# Patient Record
Sex: FEMALE | Race: WHITE | NOT HISPANIC OR LATINO | Employment: UNEMPLOYED | URBAN - METROPOLITAN AREA
[De-identification: names, ages, dates, MRNs, and addresses within clinical notes are randomized per-mention and may not be internally consistent; named-entity substitution may affect disease eponyms.]

---

## 2018-01-10 NOTE — MISCELLANEOUS
Message   Recorded as Task   Date: 02/01/2016 05:06 PM, Created By: Devaughn Alvarez   Task Name: Follow Up   Assigned To: [address] [facility], [address] [facility] Procedure,Team   Regarding Patient: Lore Joyner, Status: Active   CommentSheri Finder - 01 Feb 2016 5:06 PM     TASK CREATED  LMOM for pt to return call  Pt is post R HIP INTRA ARTICULAR INJECTION done on 1/26/16 with Saira Velasquez - 02 Feb 2016 10:10 AM     TASK EDITED   Devaughn Alvarez - 02 Feb 2016 11:46 AM     TASK EDITED  Pt lmom returning office call  Spoke to pt, she reports 40% improvement in pain, she has started physical therapy  Informed pt that it may take up to two weeks for further improvemnet in her pain  Pt verbalized understanding & appreciative  [address] - 02 Feb 2016 11:47 AM     TASK REPLIED TO: Previously Assigned To [place]  Thanks  Let patient know that we can repeat injection if she doesn't receive good pain relief after two weeks  Active Problems    1  Arthralgia of left hip (719 45) (M25 552)   2  Arthralgia of right hip (719 45) (M25 551)   3  Back pain (724 5) (M54 9)   4  Carpal tunnel syndrome, unspecified laterality (354 0) (G56 00)   5  Chronic pain syndrome (338 4) (G89 4)   6  Hand pain (729 5) (M79 643)   7  Lumbago (724 2) (M54 5)   8  Lumbar neuritis (724 4) (M54 16)   9  Pain in wrist, unspecified laterality (719 43) (M25 539)   10  Primary osteoarthritis of left wrist (715 13) (M19 032)   11  Right hip pain (719 45) (M25 551)   12  Sprain of right hip (843 9) (S73 101A)   13  Thyroid disorder (246 9) (E07 9)    Current Meds   1  Fluocinolone Acetonide 0 025 % External Ointment; Therapy: [id number] to (Evaluate:03Mar2015) Recorded   2  Synthroid TABS (Levothyroxine Sodium); Therapy: (Recorded:20Jan2014) to Recorded    Allergies    1   No Known Drug Allergies    Signatures   Electronically signed by : Leighann Andrade RN; Feb 2 2016  2:23PM EST (Author)

## 2018-01-15 NOTE — MISCELLANEOUS
Message   Recorded as Task   Date: 03/18/2016 09:48 AM, Created By: Marika Guillermo   Task Name: Call Back   Assigned To: [address] Procedure,Team   Regarding Patient: Jamil Irizarry, Status: In Progress   Comment:    Leyda Sauer - 18 Mar 2016 9:48 AM     TASK CREATED  Patient states she she has received 60% improvement from her procedure  S/P R HIP INTRA ATRICULAR INJECTION on 3/10/16 by Dr Clifton Hawkins [facility] - 18 Mar 2016 9:53 AM     TASK REPLIED TO: Previously Assigned To [address] Procedure,Team  Aware  Thanks  Leyda Sauer - 18 Mar 2016 9:54 AM     TASK IN PROGRESS        Active Problems    1  Arthralgia of left hip (719 45) (M25 552)   2  Arthralgia of right hip (719 45) (M25 551)   3  Back pain (724 5) (M54 9)   4  Carpal tunnel syndrome, unspecified laterality (354 0) (G56 00)   5  Chronic pain syndrome (338 4) (G89 4)   6  Hand pain (729 5) (M79 643)   7  Lumbago (724 2) (M54 5)   8  Lumbar neuritis (724 4) (M54 16)   9  Pain in wrist, unspecified laterality (719 43) (M25 539)   10  Primary osteoarthritis of left wrist (715 13) (M19 032)   11  Right hip pain (719 45) (M25 551)   12  Sprain of right hip (843 9) (S73 101A)   13  Thyroid disorder (246 9) (E07 9)    Current Meds   1  Fluocinolone Acetonide 0 025 % External Ointment; Therapy: [id number] to (Evaluate:03Mar2015) Recorded   2  Synthroid TABS (Levothyroxine Sodium); Therapy: (Recorded:20Jan2014) to Recorded    Allergies    1   No Known Drug Allergies    Signatures   Electronically signed by : Sammy Damon RN; Mar 18 2016  2:18PM EST                       (Author)

## 2020-06-29 ENCOUNTER — TRANSCRIBE ORDERS (OUTPATIENT)
Dept: ADMINISTRATIVE | Facility: HOSPITAL | Age: 65
End: 2020-06-29

## 2020-06-29 DIAGNOSIS — Z09 FOLLOW UP: Primary | ICD-10-CM

## 2020-06-29 DIAGNOSIS — Z12.31 SCREENING MAMMOGRAM FOR HIGH-RISK PATIENT: ICD-10-CM

## 2020-08-28 ENCOUNTER — HOSPITAL ENCOUNTER (OUTPATIENT)
Dept: RADIOLOGY | Facility: HOSPITAL | Age: 65
Discharge: HOME | End: 2020-08-28
Payer: COMMERCIAL

## 2020-08-28 VITALS — BODY MASS INDEX: 25.83 KG/M2 | WEIGHT: 155 LBS | HEIGHT: 65 IN

## 2020-08-28 DIAGNOSIS — Z09 FOLLOW UP: ICD-10-CM

## 2020-08-28 DIAGNOSIS — Z12.31 SCREENING MAMMOGRAM FOR HIGH-RISK PATIENT: ICD-10-CM

## 2020-08-28 DIAGNOSIS — Z09 FOLLOW-UP EXAM, 3-6 MONTHS SINCE PREVIOUS EXAM: ICD-10-CM

## 2020-08-28 PROCEDURE — G0279 TOMOSYNTHESIS, MAMMO: HCPCS

## 2020-08-28 PROCEDURE — 77066 DX MAMMO INCL CAD BI: CPT

## 2020-08-28 PROCEDURE — 76642 ULTRASOUND BREAST LIMITED: CPT

## 2021-04-13 DIAGNOSIS — Z23 ENCOUNTER FOR IMMUNIZATION: ICD-10-CM

## 2021-09-03 ENCOUNTER — HOSPITAL ENCOUNTER (OUTPATIENT)
Dept: RADIOLOGY | Facility: HOSPITAL | Age: 66
Discharge: HOME | End: 2021-09-03

## 2021-09-15 ENCOUNTER — HOSPITAL ENCOUNTER (OUTPATIENT)
Dept: RADIOLOGY | Facility: HOSPITAL | Age: 66
Discharge: HOME | End: 2021-09-15
Payer: COMMERCIAL

## 2021-09-15 VITALS — HEIGHT: 65 IN | BODY MASS INDEX: 25.83 KG/M2 | WEIGHT: 155 LBS

## 2021-09-15 DIAGNOSIS — Z12.31 SCREENING MAMMOGRAM, ENCOUNTER FOR: ICD-10-CM

## 2021-09-15 PROCEDURE — G0279 TOMOSYNTHESIS, MAMMO: HCPCS

## 2021-09-15 PROCEDURE — 77066 DX MAMMO INCL CAD BI: CPT

## 2021-09-15 PROCEDURE — 76642 ULTRASOUND BREAST LIMITED: CPT

## 2021-11-29 ENCOUNTER — ANESTHESIA EVENT (OUTPATIENT)
Dept: PERIOP | Facility: AMBULARY SURGERY CENTER | Age: 66
End: 2021-11-29
Payer: COMMERCIAL

## 2021-11-29 ENCOUNTER — HOSPITAL ENCOUNTER (OUTPATIENT)
Facility: AMBULARY SURGERY CENTER | Age: 66
Setting detail: OUTPATIENT SURGERY
Discharge: HOME | End: 2021-11-29
Attending: OPHTHALMOLOGY | Admitting: OPHTHALMOLOGY
Payer: COMMERCIAL

## 2021-11-29 ENCOUNTER — ANESTHESIA (OUTPATIENT)
Dept: PERIOP | Facility: AMBULARY SURGERY CENTER | Age: 66
End: 2021-11-29
Payer: COMMERCIAL

## 2021-11-29 VITALS
DIASTOLIC BLOOD PRESSURE: 83 MMHG | RESPIRATION RATE: 18 BRPM | BODY MASS INDEX: 25.83 KG/M2 | HEIGHT: 65 IN | SYSTOLIC BLOOD PRESSURE: 136 MMHG | OXYGEN SATURATION: 98 % | HEART RATE: 80 BPM | TEMPERATURE: 96.7 F | WEIGHT: 155 LBS

## 2021-11-29 DIAGNOSIS — H25.11 AGE-RELATED NUCLEAR CATARACT OF RIGHT EYE: Primary | ICD-10-CM

## 2021-11-29 PROBLEM — E03.9 HYPOTHYROIDISM: Status: ACTIVE | Noted: 2021-11-29

## 2021-11-29 PROCEDURE — V2632 POST CHMBR INTRAOCULAR LENS: HCPCS | Performed by: OPHTHALMOLOGY

## 2021-11-29 DEVICE — ACRYSOF(R) IQ ASPHERIC NATURAL IOL, SINGLE-PIECE ACRYLIC FOLDABLE PCL, UV WITH BLUE LIGHTFILTER, 13.0MM LENGTH, 6.0MM ANTERIORASYMMETRIC BICONVEX OPTIC, PLANAR HAPTICS.
Type: IMPLANTABLE DEVICE | Site: EYE | Status: FUNCTIONAL
Brand: ACRYSOF®

## 2021-11-29 RX ORDER — PHENYLEPHRINE HCL 2.5 %
1 DROPS OPHTHALMIC (EYE)
Status: COMPLETED | OUTPATIENT
Start: 2021-11-29 | End: 2021-11-29

## 2021-11-29 RX ORDER — CYCLOPENTOLATE HYDROCHLORIDE 10 MG/ML
1 SOLUTION/ DROPS OPHTHALMIC
Status: COMPLETED | OUTPATIENT
Start: 2021-11-29 | End: 2021-11-29

## 2021-11-29 RX ORDER — GATIFLOXACIN 5 MG/ML
SOLUTION/ DROPS OPHTHALMIC AS NEEDED
Status: DISCONTINUED | OUTPATIENT
Start: 2021-11-29 | End: 2021-11-29 | Stop reason: HOSPADM

## 2021-11-29 RX ORDER — MIDAZOLAM HYDROCHLORIDE 2 MG/2ML
INJECTION, SOLUTION INTRAMUSCULAR; INTRAVENOUS AS NEEDED
Status: DISCONTINUED | OUTPATIENT
Start: 2021-11-29 | End: 2021-11-29

## 2021-11-29 RX ORDER — TETRACAINE HYDROCHLORIDE 5 MG/ML
1 SOLUTION OPHTHALMIC ONCE
Status: COMPLETED | OUTPATIENT
Start: 2021-11-29 | End: 2021-11-29

## 2021-11-29 RX ORDER — LIDOCAINE HYDROCHLORIDE 20 MG/ML
1 JELLY TOPICAL
Status: COMPLETED | OUTPATIENT
Start: 2021-11-29 | End: 2021-11-29

## 2021-11-29 RX ORDER — GATIFLOXACIN 5 MG/ML
1 SOLUTION/ DROPS OPHTHALMIC 2 TIMES DAILY
Qty: 3 ML | Refills: 0 | Status: ON HOLD
Start: 2021-11-29 | End: 2022-01-03 | Stop reason: SDUPTHER

## 2021-11-29 RX ORDER — KETOROLAC TROMETHAMINE 5 MG/ML
1 SOLUTION OPHTHALMIC 4 TIMES DAILY
Qty: 5 ML | Refills: 0 | Status: ON HOLD
Start: 2021-11-29 | End: 2022-01-03 | Stop reason: SDUPTHER

## 2021-11-29 RX ORDER — BALANCED SALT SOLUTION 6.4; .75; .48; .3; 3.9; 1.7 MG/ML; MG/ML; MG/ML; MG/ML; MG/ML; MG/ML
SOLUTION OPHTHALMIC AS NEEDED
Status: DISCONTINUED | OUTPATIENT
Start: 2021-11-29 | End: 2021-11-29 | Stop reason: HOSPADM

## 2021-11-29 RX ORDER — LIDOCAINE HYDROCHLORIDE 10 MG/ML
INJECTION, SOLUTION EPIDURAL; INFILTRATION; INTRACAUDAL; PERINEURAL AS NEEDED
Status: DISCONTINUED | OUTPATIENT
Start: 2021-11-29 | End: 2021-11-29 | Stop reason: HOSPADM

## 2021-11-29 RX ORDER — KETOROLAC TROMETHAMINE 5 MG/ML
1 SOLUTION OPHTHALMIC
Status: COMPLETED | OUTPATIENT
Start: 2021-11-29 | End: 2021-11-29

## 2021-11-29 RX ORDER — TETRACAINE HYDROCHLORIDE 5 MG/ML
SOLUTION OPHTHALMIC AS NEEDED
Status: DISCONTINUED | OUTPATIENT
Start: 2021-11-29 | End: 2021-11-29 | Stop reason: HOSPADM

## 2021-11-29 RX ADMIN — PHENYLEPHRINE HYDROCHLORIDE 1 DROP: 25 SOLUTION/ DROPS OPHTHALMIC at 08:30

## 2021-11-29 RX ADMIN — KETOROLAC TROMETHAMINE 1 DROP: 5 SOLUTION OPHTHALMIC at 08:45

## 2021-11-29 RX ADMIN — TETRACAINE HYDROCHLORIDE 1 DROP: 5 SOLUTION OPHTHALMIC at 08:30

## 2021-11-29 RX ADMIN — CYCLOPENTOLATE HYDROCHLORIDE 1 DROP: 10 SOLUTION/ DROPS OPHTHALMIC at 08:45

## 2021-11-29 RX ADMIN — CYCLOPENTOLATE HYDROCHLORIDE 1 DROP: 10 SOLUTION/ DROPS OPHTHALMIC at 08:30

## 2021-11-29 RX ADMIN — KETOROLAC TROMETHAMINE 1 DROP: 5 SOLUTION OPHTHALMIC at 08:31

## 2021-11-29 RX ADMIN — KETOROLAC TROMETHAMINE 1 DROP: 5 SOLUTION OPHTHALMIC at 09:12

## 2021-11-29 RX ADMIN — CYCLOPENTOLATE HYDROCHLORIDE 1 DROP: 10 SOLUTION/ DROPS OPHTHALMIC at 08:59

## 2021-11-29 RX ADMIN — CYCLOPENTOLATE HYDROCHLORIDE 1 DROP: 10 SOLUTION/ DROPS OPHTHALMIC at 09:12

## 2021-11-29 RX ADMIN — KETOROLAC TROMETHAMINE 1 DROP: 5 SOLUTION OPHTHALMIC at 08:59

## 2021-11-29 RX ADMIN — LIDOCAINE HYDROCHLORIDE 1 APPLICATION: 20 JELLY TOPICAL at 08:31

## 2021-11-29 RX ADMIN — LIDOCAINE HYDROCHLORIDE 1 APPLICATION: 20 JELLY TOPICAL at 08:59

## 2021-11-29 RX ADMIN — PHENYLEPHRINE HYDROCHLORIDE 1 DROP: 25 SOLUTION/ DROPS OPHTHALMIC at 08:59

## 2021-11-29 RX ADMIN — PHENYLEPHRINE HYDROCHLORIDE 1 DROP: 25 SOLUTION/ DROPS OPHTHALMIC at 08:44

## 2021-11-29 RX ADMIN — LIDOCAINE HYDROCHLORIDE 1 APPLICATION: 20 JELLY TOPICAL at 08:45

## 2021-11-29 RX ADMIN — MIDAZOLAM 2 MG: 1 INJECTION INTRAMUSCULAR; INTRAVENOUS at 09:12

## 2021-11-29 RX ADMIN — PHENYLEPHRINE HYDROCHLORIDE 1 DROP: 25 SOLUTION/ DROPS OPHTHALMIC at 09:11

## 2021-12-28 NOTE — PRE-PROCEDURE INSTRUCTIONS
Pre-Surgery Instructions:   Medication Instructions    levothyroxine 100 mcg tablet Instructed patient per Anesthesia Guidelines  Pre op instructions reviewed with pt via phone  Instructed to take levothyroxine a m of surgery    Robel Real [phone]  My Surgical Experience    The following information was developed to assist you to prepare for your operation  What do I need to do before coming to the hospital?   Arrange for a responsible person to drive you to and from the hospital    Arrange care for your children at home  Children are not allowed in the recovery areas of the hospital   Plan to wear clothing that is easy to put on and take off  If you are having shoulder surgery, wear a shirt that buttons or zippers in the front  Bathing  o Shower the evening before and the morning of your surgery with an antibacterial soap  Please refer to the Pre Op Showering Instructions for Surgery Patients Sheet   o Remove nail polish and all body piercing jewelry  o Do not shave any body part for at least 24 hours before surgery-this includes face, arms, legs and upper body  Food  o Nothing to eat or drink after midnight the night before your surgery  This includes candy and chewing gum  o Exception: If your surgery is after 12:00pm (noon), you may have clear liquids such as 7-Up®, ginger ale, apple or cranberry juice, Jell-O®, water, or clear broth until 8:00 am  o Do not drink milk or juice with pulp on the morning before surgery  o Do not drink alcohol 24 hours before surgery  Medicine  o Follow instructions you received from your surgeon about which medicines you may take on the day of surgery  o If instructed to take medicine on the morning of surgery, take pills with just a small sip of water   Call your prescribing doctor for specific information on what to do if you take insulin    What should I bring to the hospital?    Bring:  Gabriella Gellanie or a walker, if you have them, for foot or knee surgery   A list of the daily medicines, vitamins, minerals, herbals and nutritional supplements you take  Include the dosages of medicines and the time you take them each day   Glasses, dentures or hearing aids   Minimal clothing; you will be wearing hospital sleepwear   Photo ID; required to verify your identity   If you have a Living Will or Power of , bring a copy of the documents   If you have an ostomy, bring an extra pouch and any supplies you use    Do not bring   Medicines or inhalers   Money, valuables or jewelry    What other information should I know about the day of surgery?  Notify your surgeons if you develop a cold, sore throat, cough, fever, rash or any other illness   Report to the Ambulatory Surgical/Same Day Surgery Unit   You will be instructed to stop at Registration only if you have not been pre-registered   Inform your  fi they do not stay that they will be asked by the staff to leave a phone number where they can be reached   Be available to be reached before surgery  In the event the operating room schedule changes, you may be asked to come in earlier or later than expected    *It is important to tell your doctor and others involved in your health care if you are taking or have been taking any non-prescription drugs, vitamins, minerals, herbals or other nutritional supplements   Any of these may interact with some food or medicines and cause a reaction

## 2022-01-03 ENCOUNTER — HOSPITAL ENCOUNTER (OUTPATIENT)
Facility: AMBULARY SURGERY CENTER | Age: 67
Setting detail: OUTPATIENT SURGERY
Discharge: HOME | End: 2022-01-03
Attending: OPHTHALMOLOGY | Admitting: OPHTHALMOLOGY
Payer: MEDICARE

## 2022-01-03 ENCOUNTER — ANESTHESIA (OUTPATIENT)
Dept: PERIOP | Facility: AMBULARY SURGERY CENTER | Age: 67
End: 2022-01-03
Payer: MEDICARE

## 2022-01-03 ENCOUNTER — ANESTHESIA EVENT (OUTPATIENT)
Dept: PERIOP | Facility: AMBULARY SURGERY CENTER | Age: 67
End: 2022-01-03
Payer: MEDICARE

## 2022-01-03 VITALS
TEMPERATURE: 97.8 F | OXYGEN SATURATION: 96 % | HEART RATE: 76 BPM | DIASTOLIC BLOOD PRESSURE: 70 MMHG | SYSTOLIC BLOOD PRESSURE: 154 MMHG | RESPIRATION RATE: 20 BRPM

## 2022-01-03 DIAGNOSIS — H25.11 AGE-RELATED NUCLEAR CATARACT OF RIGHT EYE: ICD-10-CM

## 2022-01-03 PROCEDURE — V2632 POST CHMBR INTRAOCULAR LENS: HCPCS | Performed by: OPHTHALMOLOGY

## 2022-01-03 DEVICE — ACRYSOF(R) IQ ASPHERIC NATURAL IOL, SINGLE-PIECE ACRYLIC FOLDABLE PCL, UV WITH BLUE LIGHTFILTER, 13.0MM LENGTH, 6.0MM ANTERIORASYMMETRIC BICONVEX OPTIC, PLANAR HAPTICS.
Type: IMPLANTABLE DEVICE | Site: EYE | Status: FUNCTIONAL
Brand: ACRYSOF®

## 2022-01-03 RX ORDER — TETRACAINE HYDROCHLORIDE 5 MG/ML
1 SOLUTION OPHTHALMIC ONCE
Status: COMPLETED | OUTPATIENT
Start: 2022-01-03 | End: 2022-01-03

## 2022-01-03 RX ORDER — KETOROLAC TROMETHAMINE 5 MG/ML
1 SOLUTION OPHTHALMIC
Status: ACTIVE | OUTPATIENT
Start: 2022-01-03 | End: 2022-01-03

## 2022-01-03 RX ORDER — LIDOCAINE HYDROCHLORIDE 20 MG/ML
1 JELLY TOPICAL
Status: COMPLETED | OUTPATIENT
Start: 2022-01-03 | End: 2022-01-03

## 2022-01-03 RX ORDER — MIDAZOLAM HYDROCHLORIDE 2 MG/2ML
INJECTION, SOLUTION INTRAMUSCULAR; INTRAVENOUS AS NEEDED
Status: DISCONTINUED | OUTPATIENT
Start: 2022-01-03 | End: 2022-01-03

## 2022-01-03 RX ORDER — GATIFLOXACIN 5 MG/ML
1 SOLUTION/ DROPS OPHTHALMIC 2 TIMES DAILY
Qty: 3 ML | Refills: 0
Start: 2022-01-03

## 2022-01-03 RX ORDER — TETRACAINE HYDROCHLORIDE 5 MG/ML
SOLUTION OPHTHALMIC AS NEEDED
Status: DISCONTINUED | OUTPATIENT
Start: 2022-01-03 | End: 2022-01-03 | Stop reason: HOSPADM

## 2022-01-03 RX ORDER — CYCLOPENTOLATE HYDROCHLORIDE 10 MG/ML
1 SOLUTION/ DROPS OPHTHALMIC
Status: ACTIVE | OUTPATIENT
Start: 2022-01-03 | End: 2022-01-03

## 2022-01-03 RX ORDER — ONDANSETRON 2 MG/ML
4 INJECTION INTRAMUSCULAR; INTRAVENOUS ONCE AS NEEDED
Status: CANCELLED | OUTPATIENT
Start: 2022-01-03

## 2022-01-03 RX ORDER — KETOROLAC TROMETHAMINE 5 MG/ML
1 SOLUTION OPHTHALMIC 4 TIMES DAILY
Qty: 5 ML | Refills: 0
Start: 2022-01-03

## 2022-01-03 RX ORDER — LIDOCAINE HYDROCHLORIDE 10 MG/ML
INJECTION, SOLUTION EPIDURAL; INFILTRATION; INTRACAUDAL; PERINEURAL AS NEEDED
Status: DISCONTINUED | OUTPATIENT
Start: 2022-01-03 | End: 2022-01-03 | Stop reason: HOSPADM

## 2022-01-03 RX ORDER — GATIFLOXACIN 5 MG/ML
SOLUTION/ DROPS OPHTHALMIC AS NEEDED
Status: DISCONTINUED | OUTPATIENT
Start: 2022-01-03 | End: 2022-01-03 | Stop reason: HOSPADM

## 2022-01-03 RX ORDER — PHENYLEPHRINE HCL 2.5 %
1 DROPS OPHTHALMIC (EYE)
Status: ACTIVE | OUTPATIENT
Start: 2022-01-03 | End: 2022-01-03

## 2022-01-03 RX ORDER — BALANCED SALT SOLUTION 6.4; .75; .48; .3; 3.9; 1.7 MG/ML; MG/ML; MG/ML; MG/ML; MG/ML; MG/ML
SOLUTION OPHTHALMIC AS NEEDED
Status: DISCONTINUED | OUTPATIENT
Start: 2022-01-03 | End: 2022-01-03 | Stop reason: HOSPADM

## 2022-01-03 RX ADMIN — PHENYLEPHRINE HYDROCHLORIDE 1 DROP: 25 SOLUTION/ DROPS OPHTHALMIC at 08:30

## 2022-01-03 RX ADMIN — PHENYLEPHRINE HYDROCHLORIDE 1 DROP: 25 SOLUTION/ DROPS OPHTHALMIC at 09:01

## 2022-01-03 RX ADMIN — TETRACAINE HYDROCHLORIDE 1 DROP: 5 SOLUTION OPHTHALMIC at 08:30

## 2022-01-03 RX ADMIN — CYCLOPENTOLATE HYDROCHLORIDE 1 DROP: 10 SOLUTION/ DROPS OPHTHALMIC at 09:00

## 2022-01-03 RX ADMIN — CYCLOPENTOLATE HYDROCHLORIDE 1 DROP: 10 SOLUTION/ DROPS OPHTHALMIC at 08:30

## 2022-01-03 RX ADMIN — KETOROLAC TROMETHAMINE 1 DROP: 5 SOLUTION OPHTHALMIC at 09:00

## 2022-01-03 RX ADMIN — PHENYLEPHRINE HYDROCHLORIDE 1 DROP: 25 SOLUTION/ DROPS OPHTHALMIC at 08:45

## 2022-01-03 RX ADMIN — LIDOCAINE HYDROCHLORIDE 1 APPLICATION: 20 JELLY TOPICAL at 08:30

## 2022-01-03 RX ADMIN — LIDOCAINE HYDROCHLORIDE 1 APPLICATION: 20 JELLY TOPICAL at 08:45

## 2022-01-03 RX ADMIN — KETOROLAC TROMETHAMINE 1 DROP: 5 SOLUTION OPHTHALMIC at 08:45

## 2022-01-03 RX ADMIN — CYCLOPENTOLATE HYDROCHLORIDE 1 DROP: 10 SOLUTION/ DROPS OPHTHALMIC at 08:45

## 2022-01-03 RX ADMIN — MIDAZOLAM 2 MG: 1 INJECTION INTRAMUSCULAR; INTRAVENOUS at 09:05

## 2022-01-03 RX ADMIN — KETOROLAC TROMETHAMINE 1 DROP: 5 SOLUTION OPHTHALMIC at 08:30

## 2022-01-03 RX ADMIN — LIDOCAINE HYDROCHLORIDE 1 APPLICATION: 20 JELLY TOPICAL at 09:00

## 2022-01-03 NOTE — ANESTHESIA PREPROCEDURE EVALUATION
Procedure:  EXTRACTION EXTRACAPSULAR CATARACT PHACO INTRAOCULAR LENS (IOL) (Left Eye)    Relevant Problems   ANESTHESIA (within normal limits)      CARDIO (within normal limits)      ENDO   (+) Hypothyroidism      MUSCULOSKELETAL   (+) Osteoarthritis of right hip      NEURO/PSYCH   (+) Chronic pain syndrome      PULMONARY (within normal limits)        Physical Exam    Airway    Mallampati score: II  TM Distance: >3 FB  Neck ROM: full     Dental   No notable dental hx     Cardiovascular  Rhythm: regular, Rate: normal,     Pulmonary  Breath sounds clear to auscultation,     Other Findings        Anesthesia Plan  ASA Score- 2     Anesthesia Type- IV sedation with anesthesia with ASA Monitors  Additional Monitors:   Airway Plan:           Plan Factors-Exercise tolerance (METS): >4 METS  Chart reviewed  Existing labs reviewed  Patient summary reviewed  Induction-     Postoperative Plan-     Informed Consent- Anesthetic plan and risks discussed with patient  I personally reviewed this patient with the CRNA  Discussed and agreed on the Anesthesia Plan with the CRNA  Madison Ballard

## 2022-01-03 NOTE — OP NOTE
OPERATIVE REPORT    PATIENT NAME: Amalia Gu    :  1955  MRN: [id number]  Pt Location: [place] [address] OR ROOM 01    Surgery Date: 1/3/2022    Surgeon(s) and Role:     * Pati Stallworth MD - Primary    Age-related nuclear cataract, left eye [H25 12]    Post-Op Diagnosis Codes:     * Age-related nuclear cataract, left eye [H25 12]    Procedure(s):  EXTRACTION EXTRACAPSULAR CATARACT PHACO INTRAOCULAR LENS (IOL)    Anesthesia Type:   IV Sedation with Anesthesia    Operative Indications:  Age-related nuclear cataract, left eye [H25 12]  Decreased vision to 20/50  With problems driving  Pt requested cataract sx the left eye    Procedure and Technique:    Procedure Details     The patient was brought in the OR in stable condition and placed on the operative table  The left eye was prepped and draped in the usual sterile manner  Attention was directed to the left eye where a lid speculum was placed  A 2 4 mm clear corneal incision was made temperally  1/2 cc of 1% MPF Lidocaine was irrigated into the anterior chamber followed by viscoat  The side port incision was placed superiorly  The capsularrhexis was made and the nucleus was hydrodissected with BSS  The nucleus was then removed with the phaco handpiece followed by removal of the cortical material with the I/A handpiece  The capsular bag was then filled with Provisc  The IOL was folded and placed in to the capsular bag and centered well  The remaining Provisc was removed from the eye with the I/A  The wounds were hydrated with BSS and found to be water tight  The lid speculum was removed and 2 drops of Gatifloxicin were placed over the cornea  A protective eye shield was taped over the eye and the patient went to PACU in stable condition  I will see the patient in the office tomorrow and the expected post op period is a few weeks         Complications: None        Disposition: PACU   Condition: Stable    SIGNATURE: Pati Stallworth MD  DATE: January 3, 2022  TIME: 9:25 AM

## 2022-01-03 NOTE — DISCHARGE INSTRUCTIONS
Dr Guardado Begin Cataract Instructions    Activity:     1  No Driving until instructed   2  Keep shield on until seen tomorrow except when administering drops   3  No heavy lifting   4  No water in eye     Diet:     1  Resume normal diet    Normal Symptoms:     1  Mild Headache   2  Scratchy or picky feeling around eye    Call the office if:     1  You have any questions or concerns   2  If eye pain is not relieved by extra strength tylenol    Office phone number:  [phone]      Next appointment:     1  See Dr Guardado Begin at his office tomorrow as scheduled   ________________800am__________________________________________   2  Bring blue eye kit with you and eyedrops to the office    A new set of comprehensive instructions will be given and reviewed with you during your office visit tomorrow

## 2022-01-03 NOTE — ANESTHESIA POSTPROCEDURE EVALUATION
Post-Op Assessment Note    CV Status:  Stable    Pain management: adequate     Mental Status:  Alert and awake   Hydration Status:  Euvolemic   PONV Controlled:  Controlled   Airway Patency:  Patent      Post Op Vitals Reviewed: Yes            No complications documented      BP   140/80   Temp   98   Pulse  74   Resp   18   SpO2   99

## 2022-08-26 ENCOUNTER — OFFICE VISIT (OUTPATIENT)
Dept: PHYSICAL THERAPY | Facility: CLINIC | Age: 67
End: 2022-08-26
Payer: MEDICARE

## 2022-08-26 DIAGNOSIS — M25.552 LEFT HIP PAIN: Primary | ICD-10-CM

## 2022-08-26 PROCEDURE — 97161 PT EVAL LOW COMPLEX 20 MIN: CPT

## 2022-08-26 PROCEDURE — 97112 NEUROMUSCULAR REEDUCATION: CPT

## 2022-08-26 NOTE — LETTER
2022    Raya Batres [address] [place] [address]  Meme Oneil    Patient: Milagros Anaya   YOB: 1955   Date of Visit: 2022     Encounter Diagnosis     ICD-10-CM    1  Left hip pain  M25 552        Dear Dr Velez Minus: Thank you for your recent referral of Milagros Anaay  Please review the attached evaluation summary from Myriam's recent visit  Please verify that you agree with the plan of care by signing the attached order  If you have any questions or concerns, please do not hesitate to call  I sincerely appreciate the opportunity to share in the care of one of your patients and hope to have another opportunity to work with you in the near future  Sincerely,    Lonny Arango, PT      Referring Provider:      I certify that I have read the below Plan of Care and certify the need for these services furnished under this plan of treatment while under my care  Varghese Elmore MD  [address] [place] [address]  Meme Oneil  Via Fax: [phone]          PT Evaluation     Today's date: 2022   Patient name: Milagros Anaya  : 1955  MRN: [id number]  Referring provider: Self, Referral  Dx:   Encounter Diagnosis     ICD-10-CM    1  Left hip pain  M25 552        Start Time: 1017  Stop Time: 1104  Total time in clinic (min): 47 minutes    Assessment  Assessment details: Pt is a pleasant 73 y o  female who presents to [address] with worsening L hip pain in the last 2-3 months, no specific IVON  Today, she presents with decreased and painful L hip ROM and joint mobility, decreased B LE and core strength, mod to high self reports of pain, and decreased tolerance to activity  Functionally, she is limited in her ability to stand and ambulate, perform functional transfers, negotiate stairs, perform normal household duties, and participate in normal recreation  She is motivated to improve   Pt will benefit from skilled PT to address the aforementioned deficits and limitations in an effort to maximize pain free functional mobility and overall quality of life  Progress as able with these goals in mind  Impairments: abnormal gait, abnormal muscle firing, abnormal muscle tone, abnormal or restricted ROM, abnormal movement, activity intolerance, impaired physical strength, lacks appropriate home exercise program and pain with function  Understanding of Dx/Px/POC: good   Prognosis: good    Goals  Short term goals (3 weeks):  1) Pt will improve L hip AROM to by 25%  pain free  2) Pt will improve B LE and core strength deficits by 1/3 grade MMT  3) Pt will reports pain at worse <5/10  4) Pt will initiate and progress HEP w/ special emphasis on functional L hip ROM and core/hip strength  Long term goals (6 weeks)  1) Pt will improve FOTO to at least 66   2) Pt will negotiate stairs up/down w/o deficit related to L hip  3) Pt will perform full week of exercise, normal recreation, and appropriate ambulation w/ pain in L hip <3/10 throughout     4) Pt will be independent and compliant w/ HEP in order to maximize functional benefit of skilled PT following d/c        Plan  Plan details: HEP to start: see scanned doc    1x/week to start, will progress to 2x/week if need be    Patient would benefit from: skilled PT  Planned modality interventions: cryotherapy and thermotherapy: hydrocollator packs  Planned therapy interventions: abdominal trunk stabilization, activity modification, joint mobilization, manual therapy, massage, motor coordination training, neuromuscular re-education, patient education, postural training, stretching, strengthening, therapeutic activities, therapeutic exercise, therapeutic training, transfer training, home exercise program, gait training, graded activity, functional ROM exercises, graded exercise, flexibility, body mechanics training, balance and balance/weight bearing training  Frequency: 2x week  Duration in visits: 12  Duration in weeks: 6  Treatment plan discussed with: patient        Subjective Evaluation    History of Present Illness  Mechanism of injury: Pt had R hip replaced 7 years ago, notes that this was precipitated by R groin pain and R lateral hip pain  Notes that L hip has started to bother her in a similar manner, more in groin than lateral aspect of hip  Worse after sitting, not bad in the morning  Turning over to sleep is difficult  Unable to walk for exercise s/t pain  Golfs 3x-4x/week, wears a brace to help stabilize her hip during this process  Notes that pain is catchy and can stop her in her tracks  Feels that she babies both legs so as not to increase pain  Has not internally rotated or flexed R hip into end ranges since surgery   Functional status below:   Quality of life: good    Pain  Current pain ratin  At best pain ratin  At worst pain ratin  Location: L groin, L adductor muscle bulk   Quality: tight (catch feeling)  Relieving factors: rest and medications  Aggravating factors: standing, walking, stair climbing, running, lifting, sitting and overhead activity  Progression: worsening    Social Support  Steps to enter house: yes  Stairs in house: yes   Lives in: multiple-level home  Lives with: spouse    Employment status: not working  Patient Goals  Patient goals for therapy: increased strength, decreased pain, increased motion and return to sport/leisure activities  Patient goal: be able to function with less pain         Objective     Palpation     Additional Palpation Details  TTP along proximal portion of L adductors, along L anterior hip w/ deep palpation to HF     Lumbar Screen  Lumbar range of motion within normal limits with the following exceptions:Grossly limited to 75% of normal limits for ext and rotation     Neurological Testing     Sensation     Hip   Left Hip   Intact: light touch    Right Hip   Intact: light touch    Active Range of Motion     Right Hip   Normal active range of motion    Additional Active Range of Motion Details  L side limited by 25% for end range L hip flexion, by at least 50% for L side ER, ext stiff into end range but can attain full motion     Passive Range of Motion     Right Hip   Normal passive range of motion    Additional Passive Range of Motion Details  Stiff and catch sensation into final 15% of flexion, 33-40% of IR, and end range hip extension     Strength/Myotome Testing     Left Hip   Planes of Motion   Flexion: 4-  Extension: 4  Abduction: 4-  Adduction: 4  External rotation: 4  Internal rotation: 4    Right Hip   Planes of Motion   Flexion: 4+  Extension: 4  Abduction: 4  Adduction: 4  External rotation: 4+  Internal rotation: 4+    Additional Strength Details  Core: at least 2/5     Tests     Additional Tests Details  LAD provides min relief on L LE    MWM for flexion and IR provides relief and improves pain free range post     Ambulation     Ambulation: Level Surfaces     Additional Level Surfaces Ambulation Details  Min hitch during mid stance on L side only, min antalgic, not fatiguing over clinical distances, slightly decreased WB through L LE     General Comments:      Hip Comments   Functional Testing:   Sit<>stand: UE support on LE w/ R side WS when not cued     BW squat: not past 33% before R side WS to offload L LE     Stairs: requires cueing for proper forward WS and glute drive, requires UE support       Flowsheet Rows    Flowsheet Row Most Recent Value   PT/OT G-Codes    Current Score 53   Projected Score 66   FOTO information reviewed Yes              POC expires Auth Status Total   Visits  Start date  Expiration date PT/OT + Visit Limit?  Co-Insurance    MEDICARE     No                                             Visit/Unit Tracking  AUTH Status: Date               Visits  Authed:  Used                Remaining                      Dummy Auth Tracking  1 2 3 4 5 6   7 8 9 10 11 12   13 14 15 16 17 18   19 20 21 25 22 24   25 26 27 28 34 30   31 32 33 34 35 36         Precautions: R ELIAS ~7 years ago, standard      Date (Visit #) 8/26 IE (1) (2) (3) (4) (5)   Manual              DTM and TPR                                                                        Exercise Diary         Ther Ex        Active w/u             PROM  x3-4 mins all directions           HS/glute/piri stretch  x30 sec B           QS, SLR, hip abd  QS, SLR, s/l hip abd x 10-15 each           Active mobility         Self HF and hip add chair stretch x 2-3 mins                                       Neuro Re Ed        Bridging   x10-15 reg           TrA progressions  isos x 10           Squat training  sit<>stand x 10-15            Hip Abd Progressions  intro           Balance                  HEP and POC review x 5 mins                                        Ther Act             stairs             gait             Sit<>stand                                                                                                                  Modalities             heat             Ice

## 2022-08-26 NOTE — PROGRESS NOTES
PT Evaluation     Today's date: 2022   Patient name: George Boggs  : 1955  MRN: [id number]  Referring provider: Self, Referral  Dx:   Encounter Diagnosis     ICD-10-CM    1  Left hip pain  M25 552        Start Time: 1017  Stop Time: 1104  Total time in clinic (min): 47 minutes    Assessment  Assessment details: Pt is a pleasant 73 y o  female who presents to [address] with worsening L hip pain in the last 2-3 months, no specific IVON  Today, she presents with decreased and painful L hip ROM and joint mobility, decreased B LE and core strength, mod to high self reports of pain, and decreased tolerance to activity  Functionally, she is limited in her ability to stand and ambulate, perform functional transfers, negotiate stairs, perform normal household duties, and participate in normal recreation  She is motivated to improve  Pt will benefit from skilled PT to address the aforementioned deficits and limitations in an effort to maximize pain free functional mobility and overall quality of life  Progress as able with these goals in mind  Impairments: abnormal gait, abnormal muscle firing, abnormal muscle tone, abnormal or restricted ROM, abnormal movement, activity intolerance, impaired physical strength, lacks appropriate home exercise program and pain with function  Understanding of Dx/Px/POC: good   Prognosis: good    Goals  Short term goals (3 weeks):  1) Pt will improve L hip AROM to by 25%  pain free  2) Pt will improve B LE and core strength deficits by 1/3 grade MMT  3) Pt will reports pain at worse <5/10  4) Pt will initiate and progress HEP w/ special emphasis on functional L hip ROM and core/hip strength  Long term goals (6 weeks)  1) Pt will improve FOTO to at least 66   2) Pt will negotiate stairs up/down w/o deficit related to L hip  3) Pt will perform full week of exercise, normal recreation, and appropriate ambulation w/ pain in L hip <3/10 throughout     4) Pt will be independent and compliant w/ HEP in order to maximize functional benefit of skilled PT following d/c        Plan  Plan details: HEP to start: see scanned doc    1x/week to start, will progress to 2x/week if need be    Patient would benefit from: skilled PT  Planned modality interventions: cryotherapy and thermotherapy: hydrocollator packs  Planned therapy interventions: abdominal trunk stabilization, activity modification, joint mobilization, manual therapy, massage, motor coordination training, neuromuscular re-education, patient education, postural training, stretching, strengthening, therapeutic activities, therapeutic exercise, therapeutic training, transfer training, home exercise program, gait training, graded activity, functional ROM exercises, graded exercise, flexibility, body mechanics training, balance and balance/weight bearing training  Frequency: 2x week  Duration in visits: 12  Duration in weeks: 6  Treatment plan discussed with: patient        Subjective Evaluation    History of Present Illness  Mechanism of injury: Pt had R hip replaced 7 years ago, notes that this was precipitated by R groin pain and R lateral hip pain  Notes that L hip has started to bother her in a similar manner, more in groin than lateral aspect of hip  Worse after sitting, not bad in the morning  Turning over to sleep is difficult  Unable to walk for exercise s/t pain  Golfs 3x-4x/week, wears a brace to help stabilize her hip during this process  Notes that pain is catchy and can stop her in her tracks  Feels that she babies both legs so as not to increase pain  Has not internally rotated or flexed R hip into end ranges since surgery   Functional status below:   Quality of life: good    Pain  Current pain ratin  At best pain ratin  At worst pain ratin  Location: L groin, L adductor muscle bulk   Quality: tight (catch feeling)  Relieving factors: rest and medications  Aggravating factors: standing, walking, stair climbing, running, lifting, sitting and overhead activity  Progression: worsening    Social Support  Steps to enter house: yes  Stairs in house: yes   Lives in: multiple-level home  Lives with: spouse    Employment status: not working  Patient Goals  Patient goals for therapy: increased strength, decreased pain, increased motion and return to sport/leisure activities  Patient goal: be able to function with less pain         Objective     Palpation     Additional Palpation Details  TTP along proximal portion of L adductors, along L anterior hip w/ deep palpation to HF     Lumbar Screen  Lumbar range of motion within normal limits with the following exceptions:Grossly limited to 75% of normal limits for ext and rotation     Neurological Testing     Sensation     Hip   Left Hip   Intact: light touch    Right Hip   Intact: light touch    Active Range of Motion     Right Hip   Normal active range of motion    Additional Active Range of Motion Details  L side limited by 25% for end range L hip flexion, by at least 50% for L side ER, ext stiff into end range but can attain full motion     Passive Range of Motion     Right Hip   Normal passive range of motion    Additional Passive Range of Motion Details  Stiff and catch sensation into final 15% of flexion, 33-40% of IR, and end range hip extension     Strength/Myotome Testing     Left Hip   Planes of Motion   Flexion: 4-  Extension: 4  Abduction: 4-  Adduction: 4  External rotation: 4  Internal rotation: 4    Right Hip   Planes of Motion   Flexion: 4+  Extension: 4  Abduction: 4  Adduction: 4  External rotation: 4+  Internal rotation: 4+    Additional Strength Details  Core: at least 2/5     Tests     Additional Tests Details  LAD provides min relief on L LE    MWM for flexion and IR provides relief and improves pain free range post     Ambulation     Ambulation: Level Surfaces     Additional Level Surfaces Ambulation Details  Min hitch during mid stance on L side only, min antalgic, not fatiguing over clinical distances, slightly decreased WB through L LE     General Comments:      Hip Comments   Functional Testing:   Sit<>stand: UE support on LE w/ R side WS when not cued     BW squat: not past 33% before R side WS to offload L LE     Stairs: requires cueing for proper forward WS and glute drive, requires UE support       Flowsheet Rows    Flowsheet Row Most Recent Value   PT/OT G-Codes    Current Score 53   Projected Score 66   FOTO information reviewed Yes              POC expires Auth Status Total   Visits  Start date  Expiration date PT/OT + Visit Limit?  Co-Insurance    MEDICARE     No                                             Visit/Unit Tracking  AUTH Status: Date               Visits  Authed:  Used                Remaining                      Dummy Auth Tracking  1 2 3 4 5 6   7 8 9 10 11 12   13 14 15 16 17 18   19 20 21 22 23 24   25 26 27 28 29 30   31 32 33 34 35 36         Precautions: R ELIAS ~7 years ago, standard      Date (Visit #) 8/26 IE (1) (2) (3) (4) (5)   Manual              DTM and TPR                                                                        Exercise Diary         Ther Ex        Active w/u             PROM  x3-4 mins all directions           HS/glute/piri stretch  x30 sec B           QS, SLR, hip abd  QS, SLR, s/l hip abd x 10-15 each           Active mobility         Self HF and hip add chair stretch x 2-3 mins                                       Neuro Re Ed        Bridging   x10-15 reg           TrA progressions  isos x 10           Squat training  sit<>stand x 10-15            Hip Abd Progressions  intro           Balance                  HEP and POC review x 5 mins                                        Ther Act             stairs             gait             Sit<>stand                                                                                                                  Modalities             heat             Ice

## 2022-08-31 ENCOUNTER — APPOINTMENT (OUTPATIENT)
Dept: PHYSICAL THERAPY | Facility: CLINIC | Age: 67
End: 2022-08-31
Payer: MEDICARE

## 2022-09-07 ENCOUNTER — OFFICE VISIT (OUTPATIENT)
Dept: PHYSICAL THERAPY | Facility: CLINIC | Age: 67
End: 2022-09-07
Payer: MEDICARE

## 2022-09-07 DIAGNOSIS — M25.552 LEFT HIP PAIN: Primary | ICD-10-CM

## 2022-09-07 PROCEDURE — 97110 THERAPEUTIC EXERCISES: CPT

## 2022-09-07 PROCEDURE — 97112 NEUROMUSCULAR REEDUCATION: CPT

## 2022-09-07 NOTE — PROGRESS NOTES
Daily Note     Today's date: 2022  Patient name: Mark Ramos  : 1955  MRN: [id number]  Referring provider: Self, Referral  Dx:   Encounter Diagnosis     ICD-10-CM    1  Left hip pain  M25 552                   Subjective: Not too much pain to start, no significant change since last session  Objective: requires cueing for TrA contraction throughout HF work, mod correction and less pain noted w/ proper form  Hip MWM for flex and IR w/ belt improve pain free ROM  Assessment: Tolerated treatment well  Patient demonstrated fatigue post treatment and would benefit from continued PT  Does well w/ exercise progressions today  Fatigue but no increase in pain by end of session  Shows good form w/ exercise progressions and is safe to perform below on own  Was given updated HEP to reflect below:      Plan: Continue per plan of care  tband progressions, hip CARs in standing        POC expires Auth Status Total   Visits  Start date  Expiration date PT/OT + Visit Limit?  Co-Insurance    MEDICARE     No                                             Dummy Auth Tracking  1 2 3 4 5 6   7 8 9 10 11 12   13 14 15 16 17 18   19 20 21 22 23 24   25 26 27 28 29 30   31 32 33 34 35 36         Precautions: R ELIAS ~7 years ago, standard      Date (Visit #)  IE (1)  (2) (3) (4) (5)   Manual              DTM and TPR                  MWM for L hip flex and IR 2x10-15 each                                                       Exercise Diary         Ther Ex        Active w/u             PROM  x3-4 mins all directions  x4-5 mins          HS/glute/piri stretch  x30 sec B  3x30 sec L HS          QS, SLR, hip abd  QS, SLR, s/l hip abd x 10-15 each  rev x 10-15 of each          Active mobility         Self HF and hip add chair stretch x 2-3 mins Rev         S/l clams reg x 10, w/ hip IR x 10, yellow tband x 10                              Neuro Re Ed        Bridging   x10-15 reg  2x10 w/ ad sq          TrA progressions isos x 10  isos x10, march 2x10, single leg ext 2x10         Squat training  sit<>stand x 10-15   2x10          Hip Abd Progressions  intro  side step yellow tband 20'x6         Balance                  HEP and POC review x 5 mins  Rev and update x 2-3 mins                                       Ther Act             stairs             gait             Sit<>stand                                                                                                                  Modalities             heat             Ice

## 2022-09-14 ENCOUNTER — OFFICE VISIT (OUTPATIENT)
Dept: PHYSICAL THERAPY | Facility: CLINIC | Age: 67
End: 2022-09-14
Payer: MEDICARE

## 2022-09-14 DIAGNOSIS — M25.552 LEFT HIP PAIN: Primary | ICD-10-CM

## 2022-09-14 PROCEDURE — 97112 NEUROMUSCULAR REEDUCATION: CPT

## 2022-09-14 PROCEDURE — 97110 THERAPEUTIC EXERCISES: CPT

## 2022-09-14 NOTE — PROGRESS NOTES
Daily Note     Today's date: 2022  Patient name: Diana Nichole  : 1955  MRN: [id number]  Referring provider: Self, Referral  Dx:   Encounter Diagnosis     ICD-10-CM    1  Left hip pain  M25 552                   Subjective: Pt reports that her hip feels about the same  Had busy weekend and didn't get to exercise much  Notes that pain and stiffness is worse first thing in morning and after prolonged sitting  Objective: requires cueing for true hip IR/ER vs trunk rotation and lean w/ active hip mobility progressions  Corrects and maintains  Limited by about 50% L compared to R but form improves w/ reps  Assessment: Tolerated treatment well  Patient demonstrated fatigue post treatment and would benefit from continued PT  Does well w/ exercise progressions today  Given updated handout detailing below  Will benefit from continue mobility and strength progressions as sx allow  Focus on techniques that improve pain free WB capacity through L hip  Plan: Continue per plan of care  anti-ext or rotation core work, lat lunge or slides on SB, active hip CARs in standing        POC expires Auth Status Total   Visits  Start date  Expiration date PT/OT + Visit Limit?  Co-Insurance    MEDICARE     No                                             Dummy Auth Tracking  1 2 3 4 5 6   7 8 9 10 11 12   13 14 15 16 17 18   19 20 21 22 23 24   25 26 27 28 29 30   31 32 33 34 35 36         Precautions: R ELIAS ~7 years ago, standard      Date (Visit #)  IE (1)  (2)  (3) (4) (5)   Manual              DTM and TPR                  MWM for L hip flex and IR 2x10-15 each                                                        Exercise Diary         Ther Ex        Active w/u             PROM  x3-4 mins all directions  x4-5 mins   x5 mins     Hip MWM for flex and IR 2x10-15 w/ active motion between       HS/glute/piri stretch  x30 sec B  3x30 sec L HS   L HS 3x30 sec        QS, SLR, hip abd  QS, SLR, s/l hip abd x 10-15 each  rev x 10-15 of each   rev       Active mobility         Self HF and hip add chair stretch x 2-3 mins Rev  rev       S/l clams reg x 10, w/ hip IR x 10, yellow tband x 10 S/l clams reg x 10, w/ hip IR 2x10 yellow tband B        Seated hip active IR/ER x 4-5 mins total        Standing hip rotation in WB x 2 mins              Neuro Re Ed        Bridging   x10-15 reg  2x10 w/ ad sq   x20 total reg        TrA progressions  isos x 10  isos x10, march 2x10, single leg ext 2x10  rev       Squat training  sit<>stand x 10-15   2x10   rev       Hip Abd Progressions  intro  side step yellow tband 20'x6  rev       Balance    SL burners 2x10-15 B         Active hip IR/ER in standing x 2 mins total       HEP and POC review x 5 mins  Rev and update x 2-3 mins  Rev and update x 2-3 mins                                      Ther Act             stairs             gait             Sit<>stand                                                                                                                  Modalities             heat             Ice

## 2022-09-21 ENCOUNTER — OFFICE VISIT (OUTPATIENT)
Dept: PHYSICAL THERAPY | Facility: CLINIC | Age: 67
End: 2022-09-21
Payer: MEDICARE

## 2022-09-21 DIAGNOSIS — M25.552 LEFT HIP PAIN: Primary | ICD-10-CM

## 2022-09-21 PROCEDURE — 97110 THERAPEUTIC EXERCISES: CPT

## 2022-09-21 PROCEDURE — 97112 NEUROMUSCULAR REEDUCATION: CPT

## 2022-09-21 NOTE — PROGRESS NOTES
Daily Note     Today's date: 2022  Patient name: Adam Briggs  : 1955  MRN: [id number]  Referring provider: Self, Referral  Dx:   Encounter Diagnosis     ICD-10-CM    1  Left hip pain  M25 552                   Subjective: Pt reports that she threw low back out last week  Felt like this happened shortly after last PT session  Was still able to golf but notes that she could not bend over to get ball  Objective: L hip mobility grossly 75-80% following MWM w/o catching, well maintained by end  Assessment: Tolerated treatment well  Patient demonstrated fatigue post treatment and would benefit from continued PT  Does well w/ exercise progressions today  Focused more on mobility work as compared to strength work so as not to exacerbate low back, she has golf tomorrow and Friday  Was asked to incorporate low back ext work into warm up routine prior to golf, good understanding  Good reduction in sx w/ prone and standing ext work today  Plan: Continue per plan of care  re-integrate hip strength work as able      POC expires Auth Status Total   Visits  Start date  Expiration date PT/OT + Visit Limit?  Co-Insurance    MEDICARE     No                                             Dummy Auth Tracking  1 2 3 4 5 6   7 8 9 10 11 12   13 14 15 16 17 18   19 20 21 22 23 24   25 26 27 28 29 30   31 32 33 34 35 36         Precautions: R ELIAS ~7 years ago, standard      Date (Visit #)  IE (1)  (2)  (3)  (4) (5)   Manual              DTM and TPR                  MWM for L hip flex and IR 2x10-15 each                                                        Exercise Diary         Ther Ex        Active w/u             PROM  x3-4 mins all directions  x4-5 mins   x5 mins     Hip MWM for flex and IR 2x10-15 w/ active motion between  x4-5 mins    Hip MWM for flex and IR x 15-20 each, LAD x 2-3 mins       HS/glute/piri stretch  x30 sec B  3x30 sec L HS   L HS 3x30 sec   L HS 3x30 sec      QS, SLR, hip abd  QS, SLR, s/l hip abd x 10-15 each  rev x 10-15 of each   rev  rev      Active mobility         Self HF and hip add chair stretch x 2-3 mins Rev  rev       S/l clams reg x 10, w/ hip IR x 10, yellow tband x 10 S/l clams reg x 10, w/ hip IR 2x10 yellow tband B rev       Seated hip active IR/ER x 4-5 mins total rev       Standing hip rotation in WB x 2 mins          LTR x 10-5    PPU x 10-15    Standing lumbar ext w/ OP x10-15    Neuro Re Ed        Bridging   x10-15 reg  2x10 w/ ad sq   x20 total reg        TrA progressions  isos x 10  isos x10, march 2x10, single leg ext 2x10  rev       Squat training  sit<>stand x 10-15   2x10   rev       Hip Abd Progressions  intro  side step yellow tband 20'x6  rev       Balance    SL burners 2x10-15 B         Active hip IR/ER in standing x 2 mins total       HEP and POC review x 5 mins  Rev and update x 2-3 mins  Rev and update x 2-3 mins  Rev of HEP, given Dr Sara Rushing, reviewed golf w/u and POC moving forward x 10 mins                                    Ther Act             stairs             gait             Sit<>stand                                                                                                                  Modalities             heat             Ice

## 2022-09-28 ENCOUNTER — OFFICE VISIT (OUTPATIENT)
Dept: PHYSICAL THERAPY | Facility: CLINIC | Age: 67
End: 2022-09-28
Payer: MEDICARE

## 2022-09-28 DIAGNOSIS — M25.552 LEFT HIP PAIN: Primary | ICD-10-CM

## 2022-09-28 PROCEDURE — 97112 NEUROMUSCULAR REEDUCATION: CPT

## 2022-09-28 PROCEDURE — 97110 THERAPEUTIC EXERCISES: CPT

## 2022-09-28 NOTE — PROGRESS NOTES
Progress Note     Today's date: 2022  Patient name: Kaur Mg  : 1955  MRN: [id number]  Referring provider: Self, Referral  Dx:   Encounter Diagnosis     ICD-10-CM    1  Left hip pain  M25 552                   Subjective: Pt reports that she had increased R hip pain when walking last night  Notes that she felt catching and pain in anterior aspect of L hip, had been more in groin or lateral aspect  She was able to play golf last week without pain, notes that pain overall is up and down  Feels that PT is beneficial and would like to continue until she heads to [place] later in October  Functional status below:       Goals  Short term goals (3 weeks): ALL PROGRESSED   1) Pt will improve L hip AROM to by 25%  pain free  2) Pt will improve B LE and core strength deficits by 1/3 grade MMT  3) Pt will reports pain at worse <5/10  4) Pt will initiate and progress HEP w/ special emphasis on functional L hip ROM and core/hip strength  Long term goals (6 weeks) ALL PROGRESSED   1) Pt will improve FOTO to at least 66   2) Pt will negotiate stairs up/down w/o deficit related to L hip  3) Pt will perform full week of exercise, normal recreation, and appropriate ambulation w/ pain in L hip <3/10 throughout     4) Pt will be independent and compliant w/ HEP in order to maximize functional benefit of skilled PT following d/c      *goals extended by 2 and 4 weeks, respectively     Pain  Current pain ratin-5  At best pain ratin  At worst pain ratin-8  Location: L groin, L adductor muscle bulk   Quality: tight (catch feeling)  Relieving factors: rest and medications  Aggravating factors: standing, walking, stair climbing, running, lifting, sitting and overhead activity  Progression: worse yesterday, better on average     Social Support  Steps to enter house: yes  Stairs in house: yes   Lives in: multiple-level home  Lives with: spouse    Employment status: not working  Patient Goals  Patient goals for therapy: increased strength, decreased pain, increased motion and return to sport/leisure activities  Patient goal: be able to function with less pain         Objective     Palpation     Additional Palpation Details  TTP along proximal portion of L adductors, along L anterior hip w/ deep palpation to HF    -9/28: min TTP w/ deep palpation to anterior hip     Lumbar Screen  Lumbar range of motion within normal limits with the following exceptions:Grossly limited to 75% of normal limits for ext and rotation    -9/28: similar     Neurological Testing     Sensation     Hip   Left Hip   Intact: light touch    Right Hip   Intact: light touch    Active Range of Motion     Right Hip   Normal active range of motion    Additional Active Range of Motion Details  L side limited by 25% for end range L hip flexion, by at least 50% for L side IR, ext stiff into end range but can attain full motion    -9/28: attains near end range L hip flexion following MWM, IR lacking 25% following MWM but range is still improved as compared to prior, ext range limited to ~50% (has not been a point of emphasis thus far)    Passive Range of Motion     Right Hip   Normal passive range of motion    Additional Passive Range of Motion Details  Stiff and catch sensation into final 15% of flexion, 33-40% of IR, and end range hip extension    -9/28: stiff but no catching following MWM, can attain near end range flexion w/o catching following cueing     Strength/Myotome Testing     Left Hip   Planes of Motion   Flexion: 4  Extension: 4  Abduction: 4  Adduction: 4  External rotation: 4  Internal rotation: 4    Right Hip   Planes of Motion   Flexion: 4+  Extension: 4  Abduction: 4  Adduction: 4  External rotation: 4+  Internal rotation: 4+    Additional Strength Details  Core: at least 2+/5     Tests     Additional Tests Details  LAD provides min relief on L LE   -9/28: continued     MWM for flexion and IR provides relief and improves pain free range post    -9/28: consistently beneficial throughout     Ambulation     Ambulation: Level Surfaces     Additional Level Surfaces Ambulation Details  Min hitch during mid stance on L side only, min antalgic, not fatiguing over clinical distances, slightly decreased WB through L LE    -9/28: leaves clinic w/o gait deficit, min decreased step length on L LE to start but no trunk deviations     General Comments:      Hip Comments   Functional Testing:   Sit<>stand: UE support on LE w/ R side WS when not cued    -9/28: no deficits in clinic     BW squat: not past 33% before R side WS to offload L LE    -9/28: at least 50% before min R side WS, can hold position w/o pain     Stairs: requires cueing for proper forward WS and glute drive, requires UE support    -9/28: not tested today, has not been a significant issue in recent sessions    Assessment: Tolerated treatment well  Patient demonstrated fatigue post treatment and would benefit from continued PT  Pt does well w/ manual and active mobility progressions throughout  She has made good progress towards all goals and is appropriate for continued weekly sessions over the coming weeks  We will focus on reinforcing strength and mobility program for 3 week trip to [place]  She requires cueing to decrease trunk and contralateral hip compensations w/ active mobility work but is able to correct and maintain  Will increase intensity as sx allow  Pt in agreement w/ plan  Plan: Continue per plan of care  resisted IR/ER work as able      POC expires Auth Status Total   Visits  Start date  Expiration date PT/OT + Visit Limit?  Co-Insurance    MEDICARE     No                                             Dummy Auth Tracking  1 2 3 4 5 6   7 8 9 10 11 12   13 14 15 16 17 18   19 20 21 22 23 24   25 26 27 28 29 30   31 32 33 34 35 36         Precautions: R ELIAS ~7 years ago, standard      Date (Visit #) 8/26 IE (1) 9/7 (2) 9/14 (3) 9/21 (4) 9/28 (5) PN   Manual              DTM and TPR                  MWM for L hip flex and IR 2x10-15 each                                                        Exercise Diary         Ther Ex        Active w/u             PROM  x3-4 mins all directions  x4-5 mins   x5 mins     Hip MWM for flex and IR 2x10-15 w/ active motion between  x4-5 mins    Hip MWM for flex and IR x 15-20 each, LAD x 2-3 mins    x5-6 mins     Same for IR and flexion 2x15 each    LAD 3x2 min holds   HS/glute/piri stretch  x30 sec B  3x30 sec L HS   L HS 3x30 sec   L HS 3x30 sec   3x30 sec    QS, SLR, hip abd  QS, SLR, s/l hip abd x 10-15 each  rev x 10-15 of each   rev  rev      Active mobility         Self HF and hip add chair stretch x 2-3 mins Rev  rev       S/l clams reg x 10, w/ hip IR x 10, yellow tband x 10 S/l clams reg x 10, w/ hip IR 2x10 yellow tband B rev S/l clams w/ IR x 30 total          Seated hip active IR/ER x 4-5 mins total rev       Standing hip rotation in WB x 2 mins          LTR x 10-5    PPU x 10-15    Standing lumbar ext w/ OP x10-15    Neuro Re Ed        Bridging   x10-15 reg  2x10 w/ ad sq   x20 total reg     rev   TrA progressions  isos x 10  isos x10, march 2x10, single leg ext 2x10  rev       Squat training  sit<>stand x 10-15   2x10   rev       Hip Abd Progressions  intro  side step yellow tband 20'x6  rev       Balance    SL burners 2x10-15 B   Seated hip IR/ER active w/ control x 20, w/ green loop x 20      Active hip IR/ER in standing x 2 mins total   Standing hip IR/ER active x 20     HEP and POC review x 5 mins  Rev and update x 2-3 mins  Rev and update x 2-3 mins  Rev of HEP, given Dr Emery Henry, reviewed golf w/u and POC moving forward x 10 mins Rev of HEP, POC, pre-golf work x 8 mins                                    Ther Act             stairs             gait             Sit<>stand                                                                                                                  Modalities             heat             Ice

## 2022-09-28 NOTE — LETTER
October 3, 2022    Raya Walker   [address] [address]    Patient: Mihaela Murillo   YOB: 1955   Date of Visit: 2022     Encounter Diagnosis     ICD-10-CM    1  Left hip pain  M25 552        Dear Dr Andrea Mendoza: Thank you for your recent referral of Mihaela Murillo  Please review the attached evaluation summary from Myriam's recent visit  Please verify that you agree with the plan of care by signing the attached order  If you have any questions or concerns, please do not hesitate to call  I sincerely appreciate the opportunity to share in the care of one of your patients and hope to have another opportunity to work with you in the near future  Sincerely,    Scotty Muhammad, PT      Referring Provider:      I certify that I have read the below Plan of Care and certify the need for these services furnished under this plan of treatment while under my care  Rod Banks MD  [address] [place] [address]  Via Fax: [phone]          Progress Note     Today's date: 2022  Patient name: Mihaela Murillo  : 1955  MRN: [id number]  Referring provider: Self, Referral  Dx:   Encounter Diagnosis     ICD-10-CM    1  Left hip pain  M25 552                   Subjective: Pt reports that she had increased R hip pain when walking last night  Notes that she felt catching and pain in anterior aspect of L hip, had been more in groin or lateral aspect  She was able to play golf last week without pain, notes that pain overall is up and down  Feels that PT is beneficial and would like to continue until she heads to [place] later in October  Functional status below:       Goals  Short term goals (3 weeks): ALL PROGRESSED   1) Pt will improve L hip AROM to by 25%  pain free  2) Pt will improve B LE and core strength deficits by 1/3 grade MMT  3) Pt will reports pain at worse <5/10    4) Pt will initiate and progress HEP w/ special emphasis on functional L hip ROM and core/hip strength  Long term goals (6 weeks) ALL PROGRESSED   1) Pt will improve FOTO to at least 66   2) Pt will negotiate stairs up/down w/o deficit related to L hip  3) Pt will perform full week of exercise, normal recreation, and appropriate ambulation w/ pain in L hip <3/10 throughout     4) Pt will be independent and compliant w/ HEP in order to maximize functional benefit of skilled PT following d/c      *goals extended by 2 and 4 weeks, respectively     Pain  Current pain ratin-5  At best pain ratin  At worst pain ratin-8  Location: L groin, L adductor muscle bulk   Quality: tight (catch feeling)  Relieving factors: rest and medications  Aggravating factors: standing, walking, stair climbing, running, lifting, sitting and overhead activity  Progression: worse yesterday, better on average     Social Support  Steps to enter house: yes  Stairs in house: yes   Lives in: multiple-level home  Lives with: spouse    Employment status: not working  Patient Goals  Patient goals for therapy: increased strength, decreased pain, increased motion and return to sport/leisure activities  Patient goal: be able to function with less pain         Objective     Palpation     Additional Palpation Details  TTP along proximal portion of L adductors, along L anterior hip w/ deep palpation to HF    -: min TTP w/ deep palpation to anterior hip     Lumbar Screen  Lumbar range of motion within normal limits with the following exceptions:Grossly limited to 75% of normal limits for ext and rotation    : similar     Neurological Testing     Sensation     Hip   Left Hip   Intact: light touch    Right Hip   Intact: light touch    Active Range of Motion     Right Hip   Normal active range of motion    Additional Active Range of Motion Details  L side limited by 25% for end range L hip flexion, by at least 50% for L side IR, ext stiff into end range but can attain full motion    -9/28: attains near end range L hip flexion following MWM, IR lacking 25% following MWM but range is still improved as compared to prior, ext range limited to ~50% (has not been a point of emphasis thus far)    Passive Range of Motion     Right Hip   Normal passive range of motion    Additional Passive Range of Motion Details  Stiff and catch sensation into final 15% of flexion, 33-40% of IR, and end range hip extension    -9/28: stiff but no catching following MWM, can attain near end range flexion w/o catching following cueing     Strength/Myotome Testing     Left Hip   Planes of Motion   Flexion: 4  Extension: 4  Abduction: 4  Adduction: 4  External rotation: 4  Internal rotation: 4    Right Hip   Planes of Motion   Flexion: 4+  Extension: 4  Abduction: 4  Adduction: 4  External rotation: 4+  Internal rotation: 4+    Additional Strength Details  Core: at least 2+/5     Tests     Additional Tests Details  LAD provides min relief on L LE   -9/28: continued     MWM for flexion and IR provides relief and improves pain free range post    -9/28: consistently beneficial throughout     Ambulation     Ambulation: Level Surfaces     Additional Level Surfaces Ambulation Details  Min hitch during mid stance on L side only, min antalgic, not fatiguing over clinical distances, slightly decreased WB through L LE    -9/28: leaves clinic w/o gait deficit, min decreased step length on L LE to start but no trunk deviations     General Comments:      Hip Comments   Functional Testing:   Sit<>stand: UE support on LE w/ R side WS when not cued    -9/28: no deficits in clinic     BW squat: not past 33% before R side WS to offload L LE    -9/28: at least 50% before min R side WS, can hold position w/o pain     Stairs: requires cueing for proper forward WS and glute drive, requires UE support    -9/28: not tested today, has not been a significant issue in recent sessions    Assessment: Tolerated treatment well  Patient demonstrated fatigue post treatment and would benefit from continued PT  Pt does well w/ manual and active mobility progressions throughout  She has made good progress towards all goals and is appropriate for continued weekly sessions over the coming weeks  We will focus on reinforcing strength and mobility program for 3 week trip to [place]  She requires cueing to decrease trunk and contralateral hip compensations w/ active mobility work but is able to correct and maintain  Will increase intensity as sx allow  Pt in agreement w/ plan  Plan: Continue per plan of care  resisted IR/ER work as able      POC expires Auth Status Total   Visits  Start date  Expiration date PT/OT + Visit Limit?  Co-Insurance    MEDICARE     No                                             Dummy Auth Tracking  1 2 3 4 5 6   7 8 9 10 11 12   13 14 15 16 17 18   19 20 21 22 23 24   25 26 27 28 29 30   31 32 33 34 35 36         Precautions: R ELIAS ~7 years ago, standard      Date (Visit #) 8/26 IE (1) 9/7 (2) 9/14 (3) 9/21 (4) 9/28 (5) PN   Manual              DTM and TPR                  MWM for L hip flex and IR 2x10-15 each                                                        Exercise Diary         Ther Ex        Active w/u             PROM  x3-4 mins all directions  x4-5 mins   x5 mins     Hip MWM for flex and IR 2x10-15 w/ active motion between  x4-5 mins    Hip MWM for flex and IR x 15-20 each, LAD x 2-3 mins    x5-6 mins     Same for IR and flexion 2x15 each    LAD 3x2 min holds   HS/glute/piri stretch  x30 sec B  3x30 sec L HS   L HS 3x30 sec   L HS 3x30 sec   3x30 sec    QS, SLR, hip abd  QS, SLR, s/l hip abd x 10-15 each  rev x 10-15 of each   rev  rev      Active mobility         Self HF and hip add chair stretch x 2-3 mins Rev  rev       S/l clams reg x 10, w/ hip IR x 10, yellow tband x 10 S/l clams reg x 10, w/ hip IR 2x10 yellow tband B rev S/l clams w/ IR x 30 total          Seated hip active IR/ER x 4-5 mins total rev       Standing hip rotation in WB x 2 mins          LTR x 10-5    PPU x 10-15    Standing lumbar ext w/ OP x10-15    Neuro Re Ed        Bridging   x10-15 reg  2x10 w/ ad sq   x20 total reg     rev   TrA progressions  isos x 10  isos x10, march 2x10, single leg ext 2x10  rev       Squat training  sit<>stand x 10-15   2x10   rev       Hip Abd Progressions  intro  side step yellow tband 20'x6  rev       Balance    SL burners 2x10-15 B   Seated hip IR/ER active w/ control x 20, w/ green loop x 20      Active hip IR/ER in standing x 2 mins total   Standing hip IR/ER active x 20     HEP and POC review x 5 mins  Rev and update x 2-3 mins  Rev and update x 2-3 mins  Rev of HEP, given Dr Gay Zuñiga, reviewed golf w/u and POC moving forward x 10 mins Rev of HEP, POC, pre-golf work x 8 mins                                    Ther Act             stairs             gait             Sit<>stand                                                                                                                  Modalities             heat             Ice

## 2022-10-05 ENCOUNTER — OFFICE VISIT (OUTPATIENT)
Dept: PHYSICAL THERAPY | Facility: CLINIC | Age: 67
End: 2022-10-05
Payer: MEDICARE

## 2022-10-05 DIAGNOSIS — M25.552 LEFT HIP PAIN: Primary | ICD-10-CM

## 2022-10-05 PROCEDURE — 97110 THERAPEUTIC EXERCISES: CPT

## 2022-10-05 PROCEDURE — 97112 NEUROMUSCULAR REEDUCATION: CPT

## 2022-10-05 NOTE — PROGRESS NOTES
Daily Note     Today's date: 10/5/2022  Patient name: Marchia Seip  : 1955  MRN: [id number]  Referring provider: Self, Referral  Dx:   Encounter Diagnosis     ICD-10-CM    1  Left hip pain  M25 552                   Subjective: Pt reports no significant change in the last week  Felt okay after last session, good during session but hip tightened up post  Reports that she was able to perform hip mobility drills in morning, felt this was beneficial        Objective: requires cueing for proper trunk positioning during tband exercises  Corrects and is able to perform w/ deep hip stabilization post        Assessment: Tolerated treatment well  Patient demonstrated fatigue post treatment and would benefit from continued PT  Fatigue but no increase in pain by end  Does well w/ hip mobility/stability drills, will continue to increase challenge as able  Pt to be seen 1x/week for next two weeks before she leaves for [place]  Emphasize importance of HEP in meantime  Plan: Continue per plan of care  hip abd walking, standing hip mobility work, leg on bench stretch for hip      POC expires Auth Status Total   Visits  Start date  Expiration date PT/OT + Visit Limit?  Co-Insurance    MEDICARE     No                                             Dummy Auth Tracking  1 2 3 4 5 6   7 8 9 10 11 12   13 14 15 16 17 18   19 20 21 22 23 24   25 26 27 28 29 30   31 32 33 34 35 36         Precautions: R ELIAS ~7 years ago, standard      Date (Visit #) 10/4 (6)    (3)  (4)  (5) PN   Manual              DTM and TPR                  MWM for L hip flex and IR 2x10-15 each                                                        Exercise Diary         Ther Ex        Active w/u             PROM  x3-4 mins all directions    MWM for L hip IR/flex 2x10-12    LAD x 3-4 mins   x4-5 mins   x5 mins     Hip MWM for flex and IR 2x10-15 w/ active motion between  x4-5 mins    Hip MWM for flex and IR x 15-20 each, LAD x 2-3 mins    x5-6 mins     Same for IR and flexion 2x15 each    LAD 3x2 min holds   HS/glute/piri stretch  3x30 sec on L   3x30 sec L HS   L HS 3x30 sec   L HS 3x30 sec   3x30 sec    QS, SLR, hip abd   rev x 10-15 of each   rev  rev      Active mobility          Rev  rev      S/l clams w/ IR x 20  S/l clams reg x 10, w/ hip IR x 10, yellow tband x 10 S/l clams reg x 10, w/ hip IR 2x10 yellow tband B rev S/l clams w/ IR x 30 total        Seated hip IR/ER mobility x 2-3 mins   Seated hip active IR/ER x 4-5 mins total rev       Standing hip rotation in WB x 2 mins          LTR x 10-5    PPU x 10-15    Standing lumbar ext w/ OP x10-15    Neuro Re Ed        Bridging  W/ ad sq 3x10   2x10 w/ ad sq   x20 total reg     rev   TrA progressions rev  isos x10, march 2x10, single leg ext 2x10  rev       Squat training   2x10   rev       Hip Abd Progressions Green and yellow hip abd and ext kicks x30 each   side step yellow tband 20'x6  rev       Balance  Seated IR green tband x 15 total   SL burners 2x10-15 B   Seated hip IR/ER active w/ control x 20, w/ green loop x 20      Active hip IR/ER in standing x 2 mins total   Standing hip IR/ER active x 20     HEP and POC review x 2-3 mins  Rev and update x 2-3 mins  Rev and update x 2-3 mins  Rev of HEP, given Dr Nallely Barry, reviewed golf w/u and POC moving forward x 10 mins Rev of HEP, POC, pre-golf work x 8 mins                                    Ther Act             stairs             gait             Sit<>stand                                                                                                                  Modalities             heat             Ice

## 2022-10-12 ENCOUNTER — OFFICE VISIT (OUTPATIENT)
Dept: PHYSICAL THERAPY | Facility: CLINIC | Age: 67
End: 2022-10-12
Payer: MEDICARE

## 2022-10-12 DIAGNOSIS — M25.552 LEFT HIP PAIN: Primary | ICD-10-CM

## 2022-10-12 PROCEDURE — 97112 NEUROMUSCULAR REEDUCATION: CPT

## 2022-10-12 PROCEDURE — 97110 THERAPEUTIC EXERCISES: CPT

## 2022-10-12 NOTE — PROGRESS NOTES
Daily Note     Today's date: 10/12/2022  Patient name: Bebe Muniz  : 1955  MRN: [id number]  Referring provider: Self, Referral  Dx:   Encounter Diagnosis     ICD-10-CM    1  Left hip pain  M25 552                   Subjective: 3-/10 soreness in hip to start session  Notes that pain is about the same, feels that she can move her hip a little better  Has one more visit next week before 3 week trip to [facility]  Objective: requires cueing for proper line of force and pacing w/ self hip flex, groin, and hip opener stretching in half kneel position  Corrects and maintains  Assessment: Tolerated treatment well  Patient demonstrated fatigue post treatment and would benefit from continued PT  Pt reports fatigue but no increase in pain to end session  Shows good tolerance to exercise progressions for mobility techniques  Shows good maintenance of ROM from last session, good improvement in pain free use following MWM work  Review HEP and program for next tirp at next visit, tentative d/c then  Plan: Continue per plan of care  review and print HEP      POC expires Auth Status Total   Visits  Start date  Expiration date PT/OT + Visit Limit?  Co-Insurance    MEDICARE     No                                             Dummy Auth Tracking  1 2 3 4 5 6   7 8 9 10 11 12   13 14 15 16 17 18   19 20 21 22 23 24   25 26 27 28 29 30   31 32 33 34 35 36         Precautions: R ELIAS ~7 years ago, standard      Date (Visit #) 10/4 (6)  10/12 (7)   (4)  (5) PN   Manual              DTM and TPR                                                                       Exercise Diary         Ther Ex        Active w/u             PROM  x3-4 mins all directions    MWM for L hip IR/flex 2x10-12    LAD x 3-4 mins   x4-5 mins total     MWM for L hip flex, IR 2x15     LAD 2x2 mins   x5 mins     Hip MWM for flex and IR 2x10-15 w/ active motion between  x4-5 mins    Hip MWM for flex and IR x 15-20 each, LAD x 2-3 mins x5-6 mins     Same for IR and flexion 2x15 each    LAD 3x2 min holds   HS/glute/piri stretch  3x30 sec on L   3x30 sec L HS, glute, add, HF man  L HS 3x30 sec   L HS 3x30 sec   3x30 sec    QS, SLR, hip abd    rev  rev      Active mobility           rev      S/l clams w/ IR x 20  Same x20 S/l clams reg x 10, w/ hip IR 2x10 yellow tband B rev S/l clams w/ IR x 30 total        Seated hip IR/ER mobility x 2-3 mins  x2-3 mins  Seated hip active IR/ER x 4-5 mins total rev       Standing hip rotation in WB x 2 mins          LTR x 10-5    PPU x 10-15    Standing lumbar ext w/ OP x10-15    Neuro Re Ed        Bridging  W/ ad sq 3x10  3x10 total   x20 total reg     rev   TrA progressions rev   rev       Squat training    rev       Hip Abd Progressions Green and yellow hip abd and ext kicks x30 each  rev  rev       Balance  Seated IR green tband x 15 total  Self hip openers in half kneel/HF and groin mobility work demo and practice x 4 mins  SL burners 2x10-15 B   Seated hip IR/ER active w/ control x 20, w/ green loop x 20      Active hip IR/ER in standing x 2 mins total   Standing hip IR/ER active x 20     HEP and POC review x 2-3 mins  Rev and update x 2-3 mins  Rev and update x 2-3 mins  Rev of HEP, given Dr Neville Mcelroy, reviewed golf w/u and POC moving forward x 10 mins Rev of HEP, POC, pre-golf work x 8 mins                                    Ther Act             stairs             gait             Sit<>stand                                                                                                                  Modalities             heat             Ice

## 2022-10-14 ENCOUNTER — HOSPITAL ENCOUNTER (OUTPATIENT)
Dept: RADIOLOGY | Facility: HOSPITAL | Age: 67
Discharge: HOME | End: 2022-10-14
Payer: MEDICARE

## 2022-10-14 DIAGNOSIS — Z78.0 ASYMPTOMATIC MENOPAUSAL STATE: ICD-10-CM

## 2022-10-14 DIAGNOSIS — N95.1 MENOPAUSAL STATE: ICD-10-CM

## 2022-10-14 PROCEDURE — 77080 DXA BONE DENSITY AXIAL: CPT

## 2022-10-18 ENCOUNTER — HOSPITAL ENCOUNTER (OUTPATIENT)
Dept: RADIOLOGY | Facility: HOSPITAL | Age: 67
Discharge: HOME | End: 2022-10-18
Payer: MEDICARE

## 2022-10-18 VITALS — BODY MASS INDEX: 23.07 KG/M2 | HEIGHT: 67 IN | WEIGHT: 147 LBS

## 2022-10-18 DIAGNOSIS — Z12.31 ENCOUNTER FOR SCREENING MAMMOGRAM FOR MALIGNANT NEOPLASM OF BREAST: ICD-10-CM

## 2022-10-18 PROCEDURE — 77067 SCR MAMMO BI INCL CAD: CPT

## 2022-10-18 PROCEDURE — 77063 BREAST TOMOSYNTHESIS BI: CPT

## 2022-10-19 ENCOUNTER — OFFICE VISIT (OUTPATIENT)
Dept: PHYSICAL THERAPY | Facility: CLINIC | Age: 67
End: 2022-10-19
Payer: MEDICARE

## 2022-10-19 DIAGNOSIS — M25.552 LEFT HIP PAIN: Primary | ICD-10-CM

## 2022-10-19 PROCEDURE — 97110 THERAPEUTIC EXERCISES: CPT

## 2022-10-19 PROCEDURE — 97112 NEUROMUSCULAR REEDUCATION: CPT

## 2022-10-19 NOTE — PROGRESS NOTES
Possible D/C Note     Today's date: 10/19/2022  Patient name: Holden Lanedros  : 1955  MRN: [id number]  Referring provider: Self, Referral  Dx:   Encounter Diagnosis     ICD-10-CM    1  Left hip pain  M25 552                   Subjective: Pt reports similar condition as compared to last week  Reports that she was able to golf last week w/ brace on L hip, felt good w/ brace applied  Has continued w/ exercises and notes that she will continue w/ these in [place]  Will be at [facility] home for the next 3-4 weeks, will possibly check in again w/ us following return  Functional status below:     Goals  Short term goals (3 weeks): ALL PROGRESSED  - all progressed/achieved 10/19   1) Pt will improve L hip AROM to by 25%  pain free  2) Pt will improve B LE and core strength deficits by 1/3 grade MMT  3) Pt will reports pain at worse <5/10  4) Pt will initiate and progress HEP w/ special emphasis on functional L hip ROM and core/hip strength  Long term goals (6 weeks) ALL PROGRESSED   1) Pt will improve FOTO to at least 66  - achieved 10/19   2) Pt will negotiate stairs up/down w/o deficit related to L hip  - progressed 10/19  3) Pt will perform full week of exercise, normal recreation, and appropriate ambulation w/ pain in L hip <3/10 throughout  - progressed 10/19   4) Pt will be independent and compliant w/ HEP in order to maximize functional benefit of skilled PT following d/c  - achieved 10/19    *goals extended by 2 and 4 weeks, respectively     Objective: please see  note for measurements at time of d/c  She has at least 75% of L hip active IR/ER in supine, sitting and standing following MWM work w/ belt  Her strength is roughly similar  Pain 0-6/10  No gait disturbance today  Safe and independent w/ HEP  Assessment: Tolerated treatment well  Patient demonstrated fatigue post treatment and exhibited good technique with therapeutic exercises   Pt has done well w/ exercises to date, hop mobility is good today and has improved in her time here  Pain is well controlled w/ activity modification, use of brace, and mobility work  Reviewed home program and shows good understanding  She will email or call w/ questions while away  Will email for more visits once she is back if necessary  Pt has been a pleasure to work with  Plan: D/C to HEP      POC expires Auth Status Total   Visits  Start date  Expiration date PT/OT + Visit Limit?  Co-Insurance    MEDICARE     No                                             Dummy Auth Tracking  1 2 3 4 5 6   7 8 9 10 11 12   13 14 15 16 17 18   19 20 21 22 23 24   25 26 27 28 29 30   31 32 33 34 35 36         Precautions: R ELIAS ~7 years ago, standard      Date (Visit #) 10/4 (6)  10/12 (7) 10/19 (8) D/C    9/28 (5) PN   Manual              DTM and TPR                                                                       Exercise Diary         Ther Ex        Active w/u             PROM  x3-4 mins all directions    MWM for L hip IR/flex 2x10-12    LAD x 3-4 mins   x4-5 mins total     MWM for L hip flex, IR 2x15     LAD 2x2 mins   x5 mins     Hip MWM for flex and IR 3x10-15 w/ active motion between    LAD 2x2 min holds  x4-5 mins    Hip MWM for flex and IR x 15-20 each, LAD x 2-3 mins    x5-6 mins     Same for IR and flexion 2x15 each    LAD 3x2 min holds   HS/glute/piri stretch  3x30 sec on L   3x30 sec L HS, glute, add, HF man  L HS 3x30 sec   L HS 3x30 sec   3x30 sec    QS, SLR, hip abd    rev  rev      Active mobility           rev      S/l clams w/ IR x 20  Same x20 Clams w/ IR x 20 in R s/l  rev S/l clams w/ IR x 30 total        Seated hip IR/ER mobility x 2-3 mins  x2-3 mins   rev                LTR x 10-5    PPU x 10-15    Standing lumbar ext w/ OP x10-15    Neuro Re Ed        Bridging  W/ ad sq 3x10  3x10 total  rev    rev   TrA progressions rev         Squat training          Hip Abd Progressions Green and yellow hip abd and ext kicks x30 each  rev        Balance  Seated IR green tband x 15 total  Self hip openers in half kneel/HF and groin mobility work demo and practice x 4 mins  Standing hip IR/ER, fire hydrants demo and practice x 2-3 mins   Seated hip IR/ER active w/ control x 20, w/ green loop x 20      Step up rev x 2-3 mins   Standing hip IR/ER active x 20     HEP and POC review x 2-3 mins  Rev and update x 2-3 mins  Final HEP rev x 2-3 mins  Rev of HEP, given Dr Flower Phillips, reviewed golf w/u and POC moving forward x 10 mins Rev of HEP, POC, pre-golf work x 8 mins                                    Ther Act             stairs             gait             Sit<>stand                                                                                                                  Modalities             heat             Ice

## 2022-10-26 ENCOUNTER — APPOINTMENT (OUTPATIENT)
Dept: PHYSICAL THERAPY | Facility: CLINIC | Age: 67
End: 2022-10-26

## 2024-10-22 ENCOUNTER — TELEPHONE (OUTPATIENT)
Age: 69
End: 2024-10-22

## 2024-10-22 ENCOUNTER — PREP FOR PROCEDURE (OUTPATIENT)
Age: 69
End: 2024-10-22

## 2024-10-22 DIAGNOSIS — Z12.11 SCREENING FOR COLON CANCER: Primary | ICD-10-CM

## 2024-10-22 NOTE — TELEPHONE ENCOUNTER
Scheduled date of colonoscopy (as of today): 12/10   Physician performing colonoscopy: SANDIE  Location of colonoscopy: [facility]  Bowel prep reviewed with patient: MILES/MISHA   Instructions reviewed with patient by: [email or web address]: [email or web address]

## 2024-10-22 NOTE — TELEPHONE ENCOUNTER
10/22/24  Screened by: Herlinda Hsieh    Referring Provider Arpita Burnett MD    Pre- Screening:     BMI 25.8   Has patient been referred for a routine screening Colonoscopy? yes  Is the patient between 45-75 years old? yes      Previous Colonoscopy yes   If yes:    Date: 10 YRS     Facility:     Reason:         Does the patient want to see a Gastroenterologist prior to their procedure OR are they having any GI symptoms? no    Has the patient been hospitalized or had abdominal surgery in the past 6 months? no    Does the patient use supplemental oxygen? no    Does the patient take Coumadin, Lovenox, Plavix, Elliquis, Xarelto, or other blood thinning medication? no    Has the patient had a stroke, cardiac event, or stent placed in the past year? no        If patient is between 45yrs - 49yrs, please advise patient that we will have to confirm benefits & coverage with their insurance company for a routine screening colonoscopy.

## 2024-10-29 ENCOUNTER — APPOINTMENT (RX ONLY)
Dept: URBAN - METROPOLITAN AREA CLINIC 144 | Facility: CLINIC | Age: 69
Setting detail: DERMATOLOGY
End: 2024-10-29

## 2024-10-29 DIAGNOSIS — L85.3 XEROSIS CUTIS: ICD-10-CM

## 2024-10-29 DIAGNOSIS — L23.9 ALLERGIC CONTACT DERMATITIS, UNSPECIFIED CAUSE: ICD-10-CM | Status: INADEQUATELY CONTROLLED

## 2024-10-29 DIAGNOSIS — L29.89 OTHER PRURITUS: ICD-10-CM | Status: INADEQUATELY CONTROLLED

## 2024-10-29 PROCEDURE — ? COUNSELING

## 2024-10-29 PROCEDURE — ? PRESCRIPTION MEDICATION MANAGEMENT

## 2024-10-29 PROCEDURE — ? INTRAMUSCULAR KENALOG

## 2024-10-29 PROCEDURE — ? PRESCRIPTION

## 2024-10-29 PROCEDURE — ? SEPARATE AND IDENTIFIABLE DOCUMENTATION

## 2024-10-29 PROCEDURE — 99204 OFFICE O/P NEW MOD 45 MIN: CPT | Mod: 25

## 2024-10-29 PROCEDURE — 96372 THER/PROPH/DIAG INJ SC/IM: CPT

## 2024-10-29 PROCEDURE — ? OTC TREATMENT REGIMEN

## 2024-10-29 RX ORDER — HYDROCORTISONE 25 MG/ML
LOTION TOPICAL BID
Qty: 118 | Refills: 0 | Status: ERX | COMMUNITY
Start: 2024-10-29

## 2024-10-29 RX ADMIN — HYDROCORTISONE: 25 LOTION TOPICAL at 00:00

## 2024-10-29 ASSESSMENT — LOCATION SIMPLE DESCRIPTION DERM
LOCATION SIMPLE: RIGHT BUTTOCK
LOCATION SIMPLE: RIGHT FOREARM
LOCATION SIMPLE: LEFT CHEEK
LOCATION SIMPLE: NOSE
LOCATION SIMPLE: RIGHT CHEEK
LOCATION SIMPLE: LEFT FOREHEAD
LOCATION SIMPLE: LEFT HAND
LOCATION SIMPLE: LEFT PRETIBIAL REGION
LOCATION SIMPLE: RIGHT PRETIBIAL REGION
LOCATION SIMPLE: RIGHT FOREHEAD
LOCATION SIMPLE: CHIN

## 2024-10-29 ASSESSMENT — LOCATION DETAILED DESCRIPTION DERM
LOCATION DETAILED: LEFT RADIAL DORSAL HAND
LOCATION DETAILED: RIGHT INFERIOR MEDIAL MALAR CHEEK
LOCATION DETAILED: RIGHT INFERIOR CENTRAL MALAR CHEEK
LOCATION DETAILED: RIGHT INFERIOR MEDIAL FOREHEAD
LOCATION DETAILED: RIGHT BUTTOCK
LOCATION DETAILED: LEFT INFERIOR CENTRAL MALAR CHEEK
LOCATION DETAILED: RIGHT PROXIMAL PRETIBIAL REGION
LOCATION DETAILED: LEFT INFERIOR MEDIAL MALAR CHEEK
LOCATION DETAILED: NASAL DORSUM
LOCATION DETAILED: RIGHT PROXIMAL DORSAL FOREARM
LOCATION DETAILED: LEFT MEDIAL FOREHEAD
LOCATION DETAILED: LEFT CHIN
LOCATION DETAILED: LEFT DISTAL PRETIBIAL REGION

## 2024-10-29 ASSESSMENT — SEVERITY ASSESSMENT 2020: SEVERITY 2020: MODERATE

## 2024-10-29 ASSESSMENT — LOCATION ZONE DERM
LOCATION ZONE: TRUNK
LOCATION ZONE: FACE
LOCATION ZONE: NOSE
LOCATION ZONE: HAND
LOCATION ZONE: LEG
LOCATION ZONE: ARM

## 2024-10-29 ASSESSMENT — BSA RASH: BSA RASH: 10

## 2024-10-29 ASSESSMENT — ITCH NUMERIC RATING SCALE: HOW SEVERE IS YOUR ITCHING?: 5

## 2024-10-29 NOTE — PROCEDURE: OTC TREATMENT REGIMEN
Detail Level: Zone
Patient Specific Otc Recommendations (Will Not Stick From Patient To Patient): Cerave anti-itch relief cream use as needed.
Patient Specific Otc Recommendations (Will Not Stick From Patient To Patient): Cerav foam cleanser (normal to dry skin) and moisturizer. Samples provided. \\nCerave itch relief cream.\\nElta MD tinted sunscreen

## 2024-10-29 NOTE — PROCEDURE: INTRAMUSCULAR KENALOG
Concentration (Mg/Ml) Of Additional Medication: 2.5
Total Volume (Ccs): 1
Lot # (Optional): [id number]
Consent: The risks of atrophy were reviewed with the patient.
Administered By (Optional): JULIO CESAR
Concentration (Mg/Ml): 40.0
Kenalog Preparation: kenalog
Expiration Date (Optional): 12/2024
Ndc# (Optional): 1183-7057-74
Detail Level: None
Add Option For Additional Mediation: No

## 2024-10-29 NOTE — PROCEDURE: PRESCRIPTION MEDICATION MANAGEMENT
Detail Level: Detailed
Render In Strict Bullet Format?: No
Initiate Treatment: If failure to respond to OTC topicals, prescription Lac Hydrin 12% or urea creams in reserve

## 2024-10-29 NOTE — PROCEDURE: COUNSELING
Moisturizer Recommendations: Eucerin \\nCerave\\nAm Lactin
Cleanser Recommendations: Dove bar soap
Detail Level: Zone
Patient Specific Counseling (Will Not Stick From Patient To Patient): Patient instructed to discontinue all facial products presently using including \"natural products her daughter started selling\" . The Ingredients from products brought in to office reviewed. One isolated product approved by myself  with Zinc and Titanium dioxide as only ingredients listed. I will begin regimen of using vanicream and Cerave only. Once rash entirely resolved, will try one product at a time, for full 10 days without reaction prior to trying additional product. 
Moisturizer Recommendations: CeraVe cream\\nApplication of these creams should be daily to semi-wet skin when first out of shower
Cleanser Recommendations: Cerave cleanser face\\nVanicream Shampoo and Conditioner Dove bar soap- body\\Ralph Free and Clear Laundry detergent
Moisturizer Recommendations: Eucerin Intense itch relief

## 2024-11-19 ENCOUNTER — APPOINTMENT (RX ONLY)
Dept: URBAN - METROPOLITAN AREA CLINIC 144 | Facility: CLINIC | Age: 69
Setting detail: DERMATOLOGY
End: 2024-11-19

## 2024-11-19 DIAGNOSIS — L29.89 OTHER PRURITUS: ICD-10-CM | Status: RESOLVED

## 2024-11-19 DIAGNOSIS — L23.9 ALLERGIC CONTACT DERMATITIS, UNSPECIFIED CAUSE: ICD-10-CM | Status: RESOLVED

## 2024-11-19 PROCEDURE — ? COUNSELING

## 2024-11-19 PROCEDURE — 99213 OFFICE O/P EST LOW 20 MIN: CPT

## 2024-11-19 PROCEDURE — ? OTC TREATMENT REGIMEN

## 2024-11-19 ASSESSMENT — LOCATION SIMPLE DESCRIPTION DERM
LOCATION SIMPLE: RIGHT FOREHEAD
LOCATION SIMPLE: LEFT HAND
LOCATION SIMPLE: NOSE
LOCATION SIMPLE: LEFT FOREHEAD
LOCATION SIMPLE: CHIN
LOCATION SIMPLE: LEFT CHEEK
LOCATION SIMPLE: RIGHT CHEEK
LOCATION SIMPLE: RIGHT FOREARM

## 2024-11-19 ASSESSMENT — LOCATION DETAILED DESCRIPTION DERM
LOCATION DETAILED: LEFT INFERIOR MEDIAL MALAR CHEEK
LOCATION DETAILED: RIGHT PROXIMAL DORSAL FOREARM
LOCATION DETAILED: LEFT RADIAL DORSAL HAND
LOCATION DETAILED: LEFT MEDIAL FOREHEAD
LOCATION DETAILED: LEFT INFERIOR CENTRAL MALAR CHEEK
LOCATION DETAILED: RIGHT INFERIOR MEDIAL MALAR CHEEK
LOCATION DETAILED: RIGHT INFERIOR CENTRAL MALAR CHEEK
LOCATION DETAILED: LEFT CHIN
LOCATION DETAILED: RIGHT INFERIOR MEDIAL FOREHEAD
LOCATION DETAILED: NASAL DORSUM

## 2024-11-19 ASSESSMENT — LOCATION ZONE DERM
LOCATION ZONE: NOSE
LOCATION ZONE: FACE
LOCATION ZONE: ARM
LOCATION ZONE: HAND

## 2024-11-19 ASSESSMENT — ITCH NUMERIC RATING SCALE: HOW SEVERE IS YOUR ITCHING?: 0

## 2024-11-19 ASSESSMENT — SEVERITY ASSESSMENT 2020: SEVERITY 2020: CLEAR

## 2024-11-19 ASSESSMENT — BSA RASH: BSA RASH: 0

## 2024-11-19 NOTE — PROCEDURE: COUNSELING
Moisturizer Recommendations: CeraVe cream\\nApplication of these creams should be daily to semi-wet skin when first out of shower
Detail Level: Zone
Cleanser Recommendations: Cerave cleanser face\\nVanicream Shampoo and Conditioner Dove bar soap- body\\Ralph Free and Clear Laundry detergent
Moisturizer Recommendations: Eucerin Intense itch relief

## 2024-11-19 NOTE — PROCEDURE: OTC TREATMENT REGIMEN
Patient Specific Otc Recommendations (Will Not Stick From Patient To Patient): Continue: Cerave foam cleanser (normal to dry skin) and moisturizer. Samples provided. \\nCerave itch relief cream.\\nElta MD tinted sunscreen
Detail Level: Zone

## 2024-11-19 NOTE — PROCEDURE: MIPS QUALITY
Detail Level: Detailed
Quality 130: Documentation Of Current Medications In The Medical Record: Current Medications Documented
Quality 47: Advance Care Plan: Advance Care Planning discussed and documented; advance care plan or surrogate decision maker documented in the medical record.
Quality 226: Preventive Care And Screening: Tobacco Use: Screening And Cessation Intervention: Patient screened for tobacco use and is an ex/non-smoker
Quality 486: Dermatitis - Improvement In Patient-Reported Itch Severity: Itch severity assessment score is reduced by 3 or more points from the initial (index) assessment score to the follow-up visit score

## 2024-11-26 ENCOUNTER — ANESTHESIA (OUTPATIENT)
Dept: ANESTHESIOLOGY | Facility: HOSPITAL | Age: 69
End: 2024-11-26

## 2024-11-26 ENCOUNTER — ANESTHESIA EVENT (OUTPATIENT)
Dept: ANESTHESIOLOGY | Facility: HOSPITAL | Age: 69
End: 2024-11-26

## 2024-12-10 ENCOUNTER — ANESTHESIA (OUTPATIENT)
Dept: GASTROENTEROLOGY | Facility: AMBULARY SURGERY CENTER | Age: 69
End: 2024-12-10
Payer: MEDICARE

## 2024-12-10 ENCOUNTER — HOSPITAL ENCOUNTER (OUTPATIENT)
Dept: GASTROENTEROLOGY | Facility: AMBULARY SURGERY CENTER | Age: 69
Setting detail: OUTPATIENT SURGERY
Discharge: HOME | End: 2024-12-10
Attending: INTERNAL MEDICINE
Payer: MEDICARE

## 2024-12-10 VITALS
RESPIRATION RATE: 18 BRPM | SYSTOLIC BLOOD PRESSURE: 115 MMHG | TEMPERATURE: 97.3 F | OXYGEN SATURATION: 97 % | DIASTOLIC BLOOD PRESSURE: 63 MMHG | HEART RATE: 70 BPM | WEIGHT: 155 LBS | BODY MASS INDEX: 25.83 KG/M2 | HEIGHT: 65 IN

## 2024-12-10 DIAGNOSIS — Z12.11 SCREENING FOR COLON CANCER: ICD-10-CM

## 2024-12-10 PROCEDURE — 88305 TISSUE EXAM BY PATHOLOGIST: CPT | Performed by: STUDENT IN AN ORGANIZED HEALTH CARE EDUCATION/TRAINING PROGRAM

## 2024-12-10 PROCEDURE — 45385 COLONOSCOPY W/LESION REMOVAL: CPT | Performed by: INTERNAL MEDICINE

## 2024-12-10 RX ORDER — SODIUM CHLORIDE, SODIUM LACTATE, POTASSIUM CHLORIDE, CALCIUM CHLORIDE 600; 310; 30; 20 MG/100ML; MG/100ML; MG/100ML; MG/100ML
125 INJECTION, SOLUTION INTRAVENOUS CONTINUOUS
Status: CANCELLED | OUTPATIENT
Start: 2024-12-10

## 2024-12-10 RX ORDER — SODIUM CHLORIDE, SODIUM LACTATE, POTASSIUM CHLORIDE, CALCIUM CHLORIDE 600; 310; 30; 20 MG/100ML; MG/100ML; MG/100ML; MG/100ML
INJECTION, SOLUTION INTRAVENOUS CONTINUOUS PRN
Status: DISCONTINUED | OUTPATIENT
Start: 2024-12-10 | End: 2024-12-10

## 2024-12-10 RX ORDER — PROPOFOL 10 MG/ML
INJECTION, EMULSION INTRAVENOUS AS NEEDED
Status: DISCONTINUED | OUTPATIENT
Start: 2024-12-10 | End: 2024-12-10

## 2024-12-10 RX ORDER — LIDOCAINE HYDROCHLORIDE 10 MG/ML
INJECTION, SOLUTION EPIDURAL; INFILTRATION; INTRACAUDAL; PERINEURAL AS NEEDED
Status: DISCONTINUED | OUTPATIENT
Start: 2024-12-10 | End: 2024-12-10

## 2024-12-10 RX ORDER — PROPOFOL 10 MG/ML
INJECTION, EMULSION INTRAVENOUS CONTINUOUS PRN
Status: DISCONTINUED | OUTPATIENT
Start: 2024-12-10 | End: 2024-12-10

## 2024-12-10 RX ADMIN — PROPOFOL 100 MG: 10 INJECTION, EMULSION INTRAVENOUS at 07:38

## 2024-12-10 RX ADMIN — PROPOFOL 120 MCG/KG/MIN: 10 INJECTION, EMULSION INTRAVENOUS at 07:38

## 2024-12-10 RX ADMIN — LIDOCAINE HYDROCHLORIDE 50 MG: 10 INJECTION, SOLUTION EPIDURAL; INFILTRATION; INTRACAUDAL; PERINEURAL at 07:38

## 2024-12-10 RX ADMIN — SODIUM CHLORIDE, SODIUM LACTATE, POTASSIUM CHLORIDE, AND CALCIUM CHLORIDE: .6; .31; .03; .02 INJECTION, SOLUTION INTRAVENOUS at 07:23

## 2024-12-10 NOTE — H&P
History and Physical - [facility]  Myriam Jackson 68 y.o. female MRN: [id number]                  HPI: Myriam Jackson is a 68 y.o. year old female who presents for colon cancer screening, open access.      REVIEW OF SYSTEMS: Per the HPI, and otherwise unremarkable.    Historical Information   Past Medical History:   Diagnosis Date    Arthritis     Disease of thyroid gland     PONV (postoperative nausea and vomiting)      Past Surgical History:   Procedure Laterality Date    APPENDECTOMY      CATARACT EXTRACTION      COLONOSCOPY      JOINT REPLACEMENT Right 2015    hip    KNEE ARTHROSCOPY Left 1977    NM ARTHROCENTESIS ASPIR&/INJ MAJOR JT/BURSA W/O US Right 3/10/2016    Procedure: INJECTION JOINT HIP-intra-articular   (C-ARM);  Surgeon: Adam Llanes MD;  Location: [facility] MAIN OR;  Service: Pain Management     NM ARTHROCENTESIS ASPIR&/INJ MAJOR JT/BURSA W/O US Right 1/26/2016    Procedure: INJECTION JOINT HIP-Intra articular injection (C-ARM);  Surgeon: Adam Llanes MD;  Location: [facility] MAIN OR;  Service: Pain Management     NM XCAPSL CTRC RMVL INSJ IO LENS PROSTH W/O ECP Right 11/29/2021    Procedure: EXTRACTION EXTRACAPSULAR CATARACT PHACO INTRAOCULAR LENS (IOL);  Surgeon: Rony Leon MD;  Location: [facility] MAIN OR;  Service: Ophthalmology    NM XCAPSL CTRC RMVL INSJ IO LENS PROSTH W/O ECP Left 1/3/2022    Procedure: EXTRACTION EXTRACAPSULAR CATARACT PHACO INTRAOCULAR LENS (IOL);  Surgeon: Rony Leon MD;  Location: [facility] MAIN OR;  Service: Ophthalmology    TUBAL LIGATION      lap    WRIST ARTHROSCOPY Left 2015    surgery left wrist     Social History   Social History     Substance and Sexual Activity   Alcohol Use Yes    Comment: rare     Social History     Substance and Sexual Activity   Drug Use No     Social History     Tobacco Use   Smoking Status Never   Smokeless Tobacco Never     Family History   Problem Relation Age of Onset    Breast cancer Mother 48        total  "mastectomy    Stomach cancer Father     No Known Problems Maternal Aunt     No Known Problems Maternal Aunt     No Known Problems Paternal Aunt     No Known Problems Paternal Aunt        Meds/Allergies       Current Outpatient Medications:     Cholecalciferol (VITAMIN D3) 1,000 units tablet    levothyroxine 100 mcg tablet  No current facility-administered medications for this encounter.    Facility-Administered Medications Ordered in Other Encounters:     lactated ringers infusion, , Intravenous, Continuous PRN, New Bag at 12/10/24 0723    No Known Allergies    Objective     /67   Pulse 85   Temp (!) 97.3 °F (36.3 °C) (Temporal)   Resp 16   Ht 5' 5\" (1.651 m)   Wt 70.3 kg (155 lb)   SpO2 97%   BMI 25.79 kg/m²       PHYSICAL EXAM    Gen: NAD  Head: NCAT  CV: RRR  CHEST: Clear  ABD: soft, NT/ND  EXT: no edema      ASSESSMENT/PLAN:  This is a 68 y.o. year old female here for colonoscopy, and she is stable and optimized for her procedure.        "

## 2024-12-10 NOTE — ANESTHESIA POSTPROCEDURE EVALUATION
Post-Op Assessment Note    CV Status:  Stable  Pain Score: 0    Pain management: adequate       Mental Status:  Sleepy   Hydration Status:  Euvolemic   PONV Controlled:  Controlled   Airway Patency:  Patent     Post Op Vitals Reviewed: Yes    No anethesia notable event occurred.    Staff: Anesthesiologist, CRNA           Last Filed PACU Vitals:  Vitals Value Taken Time   Temp     Pulse 74 12/10/24 0758   /59 12/10/24 0758   Resp 12 12/10/24 0758   SpO2 99 % 12/10/24 0758       Modified Andrés:  Activity: 2 (12/10/2024  7:03 AM)  Respiration: 2 (12/10/2024  7:03 AM)  Consciousness: 2 (12/10/2024  7:03 AM)  Oxygen Saturation: 2 (12/10/2024  7:03 AM)

## 2024-12-10 NOTE — ANESTHESIA PREPROCEDURE EVALUATION
Procedure:  COLONOSCOPY    Relevant Problems   ENDO   (+) Hypothyroidism      NEURO/PSYCH   (+) Chronic pain syndrome        Physical Exam    Airway    Mallampati score: II  TM Distance: >3 FB  Neck ROM: full     Dental   Comment: Denies loose teeth     Cardiovascular  Cardiovascular exam normal    Pulmonary  Pulmonary exam normal     Other Findings  Portions of exam deferred due to low yield and/or unknown COVID statuspost-pubertal.      Anesthesia Plan  ASA Score- 2     Anesthesia Type- IV sedation with anesthesia with ASA Monitors.         Additional Monitors:     Airway Plan:            Plan Factors-Exercise tolerance (METS): >4 METS.    Chart reviewed.   Existing labs reviewed. Patient summary reviewed.    Patient is not a current smoker.              Induction- intravenous.    Postoperative Plan-         Informed Consent- Anesthetic plan and risks discussed with patient.  I personally reviewed this patient with the CRNA. Discussed and agreed on the Anesthesia Plan with the CRNA..

## 2024-12-12 ENCOUNTER — RESULTS FOLLOW-UP (OUTPATIENT)
Dept: GASTROENTEROLOGY | Facility: AMBULARY SURGERY CENTER | Age: 69
End: 2024-12-12

## 2024-12-12 PROCEDURE — 88305 TISSUE EXAM BY PATHOLOGIST: CPT | Performed by: STUDENT IN AN ORGANIZED HEALTH CARE EDUCATION/TRAINING PROGRAM

## (undated) DEVICE — B-H IRRIGATING CAN 19GA FLAT ANGLED 8MM: Brand: OPHTHALMIC CANNULA

## (undated) DEVICE — INTREPID® TRANSFORMER IA HP: Brand: INTREPID®

## (undated) DEVICE — MICROSURGICAL INSTRUMENT IRR. CYSTITOME 25GA STRAIGHT-REVERSE CUTTING: Brand: ALCON

## (undated) DEVICE — THE MONARCH® "D" CARTRIDGE IS A SINGLE-USE POLYPROPYLENE CARTRIDGE FOR POSTERIOR CHAMBER IOL DELIVERY: Brand: MONARCH® III

## (undated) DEVICE — AIR INJECT CANNULA 27GA: Brand: OPHTHALMIC CANNULA

## (undated) DEVICE — GLOVE SRG BIOGEL 7.5

## (undated) DEVICE — ACTIVE FMS W/ INTREPID* ULTRA SLEEVES, 0.9MM 45° ABS* INTREPID* BALANCED TIP: Brand: ALCON

## (undated) DEVICE — EYE PACK CUSTOM -FINNEGAN

## (undated) DEVICE — CLEARCUT® SLIT KNIFE INTREPID MICRO-COAXIAL SYSTEM 2.4 SB: Brand: CLEARCUT®; INTREPID